# Patient Record
Sex: FEMALE | Race: WHITE | Employment: OTHER | URBAN - METROPOLITAN AREA
[De-identification: names, ages, dates, MRNs, and addresses within clinical notes are randomized per-mention and may not be internally consistent; named-entity substitution may affect disease eponyms.]

---

## 2017-12-11 ENCOUNTER — HOSPITAL ENCOUNTER (OUTPATIENT)
Dept: NON INVASIVE DIAGNOSTICS | Age: 72
Discharge: HOME | End: 2017-12-11
Payer: COMMERCIAL

## 2017-12-11 ENCOUNTER — HOSPITAL ENCOUNTER (OUTPATIENT)
Dept: NON INVASIVE DIAGNOSTICS | Age: 72
Discharge: HOME | End: 2017-12-11

## 2017-12-11 VITALS — BODY MASS INDEX: 26.61 KG/M2 | WEIGHT: 145.5 LBS

## 2017-12-11 DIAGNOSIS — R06.00 DYSPNEA: ICD-10-CM

## 2017-12-11 PROCEDURE — 93351 STRESS TTE COMPLETE: CPT

## 2017-12-11 PROCEDURE — 74011250636 HC RX REV CODE- 250/636

## 2017-12-11 RX ORDER — DOBUTAMINE HYDROCHLORIDE 200 MG/100ML
INJECTION INTRAVENOUS
Status: COMPLETED
Start: 2017-12-11 | End: 2017-12-11

## 2017-12-11 RX ORDER — DOBUTAMINE HYDROCHLORIDE 200 MG/100ML
0-40 INJECTION INTRAVENOUS
Status: DISCONTINUED | OUTPATIENT
Start: 2017-12-11 | End: 2017-12-11

## 2017-12-11 RX ADMIN — DOBUTAMINE HYDROCHLORIDE 10 MCG/KG/MIN: 200 INJECTION INTRAVENOUS at 11:16

## 2017-12-27 ENCOUNTER — HOSPITAL ENCOUNTER (OUTPATIENT)
Dept: MAMMOGRAPHY | Age: 72
Discharge: HOME | End: 2017-12-27
Payer: COMMERCIAL

## 2017-12-27 DIAGNOSIS — Z12.31 ENCOUNTER FOR SCREENING MAMMOGRAM FOR MALIGNANT NEOPLASM OF BREAST: ICD-10-CM

## 2017-12-27 PROCEDURE — 77063 BREAST TOMOSYNTHESIS BI: CPT

## 2018-01-08 ENCOUNTER — APPOINTMENT (OUTPATIENT)
Dept: GENERAL RADIOLOGY | Age: 73
End: 2018-01-08
Attending: NURSE PRACTITIONER
Payer: COMMERCIAL

## 2018-01-08 ENCOUNTER — HOSPITAL ENCOUNTER (EMERGENCY)
Age: 73
Discharge: HOME | End: 2018-01-08
Attending: EMERGENCY MEDICINE
Payer: COMMERCIAL

## 2018-01-08 ENCOUNTER — APPOINTMENT (OUTPATIENT)
Dept: CT IMAGING | Age: 73
End: 2018-01-08
Attending: NURSE PRACTITIONER
Payer: COMMERCIAL

## 2018-01-08 VITALS
SYSTOLIC BLOOD PRESSURE: 148 MMHG | TEMPERATURE: 98.1 F | DIASTOLIC BLOOD PRESSURE: 82 MMHG | HEIGHT: 62 IN | BODY MASS INDEX: 29.44 KG/M2 | HEART RATE: 79 BPM | OXYGEN SATURATION: 96 % | WEIGHT: 160 LBS | RESPIRATION RATE: 16 BRPM

## 2018-01-08 DIAGNOSIS — R03.0 ELEVATED BLOOD PRESSURE READING: ICD-10-CM

## 2018-01-08 DIAGNOSIS — R07.9 CHEST PAIN, UNSPECIFIED TYPE: Primary | ICD-10-CM

## 2018-01-08 LAB
ALBUMIN SERPL-MCNC: 4.1 G/DL (ref 3.4–5)
ALBUMIN/GLOB SERPL: 1.1 {RATIO} (ref 0.8–1.7)
ALP SERPL-CCNC: 98 U/L (ref 45–117)
ALT SERPL-CCNC: 30 U/L (ref 13–56)
ANION GAP SERPL CALC-SCNC: 10 MMOL/L (ref 3–18)
APPEARANCE UR: CLEAR
AST SERPL-CCNC: 21 U/L (ref 15–37)
BASOPHILS # BLD: 0 K/UL (ref 0–0.06)
BASOPHILS NFR BLD: 0 % (ref 0–2)
BILIRUB SERPL-MCNC: 0.3 MG/DL (ref 0.2–1)
BILIRUB UR QL: NEGATIVE
BNP SERPL-MCNC: 198 PG/ML (ref 0–900)
BUN SERPL-MCNC: 13 MG/DL (ref 7–18)
BUN/CREAT SERPL: 18 (ref 12–20)
CALCIUM SERPL-MCNC: 10.1 MG/DL (ref 8.5–10.1)
CHLORIDE SERPL-SCNC: 107 MMOL/L (ref 100–108)
CK MB CFR SERPL CALC: 2.9 % (ref 0–4)
CK MB CFR SERPL CALC: 3.2 % (ref 0–4)
CK MB SERPL-MCNC: 1.7 NG/ML (ref 5–25)
CK MB SERPL-MCNC: 2.4 NG/ML (ref 5–25)
CK SERPL-CCNC: 59 U/L (ref 26–192)
CK SERPL-CCNC: 76 U/L (ref 26–192)
CO2 SERPL-SCNC: 25 MMOL/L (ref 21–32)
COLOR UR: YELLOW
CREAT SERPL-MCNC: 0.73 MG/DL (ref 0.6–1.3)
D DIMER PPP FEU-MCNC: 0.96 UG/ML(FEU)
DIFFERENTIAL METHOD BLD: ABNORMAL
EOSINOPHIL # BLD: 0 K/UL (ref 0–0.4)
EOSINOPHIL NFR BLD: 0 % (ref 0–5)
ERYTHROCYTE [DISTWIDTH] IN BLOOD BY AUTOMATED COUNT: 14.9 % (ref 11.6–14.5)
GLOBULIN SER CALC-MCNC: 3.6 G/DL (ref 2–4)
GLUCOSE SERPL-MCNC: 171 MG/DL (ref 74–99)
GLUCOSE UR STRIP.AUTO-MCNC: NEGATIVE MG/DL
HCT VFR BLD AUTO: 42.2 % (ref 35–45)
HGB BLD-MCNC: 14.1 G/DL (ref 12–16)
HGB UR QL STRIP: NEGATIVE
KETONES UR QL STRIP.AUTO: NEGATIVE MG/DL
LEUKOCYTE ESTERASE UR QL STRIP.AUTO: NEGATIVE
LYMPHOCYTES # BLD: 0.6 K/UL (ref 0.9–3.6)
LYMPHOCYTES NFR BLD: 11 % (ref 21–52)
MCH RBC QN AUTO: 29.1 PG (ref 24–34)
MCHC RBC AUTO-ENTMCNC: 33.4 G/DL (ref 31–37)
MCV RBC AUTO: 87.2 FL (ref 74–97)
MONOCYTES # BLD: 0 K/UL (ref 0.05–1.2)
MONOCYTES NFR BLD: 1 % (ref 3–10)
NEUTS SEG # BLD: 4.5 K/UL (ref 1.8–8)
NEUTS SEG NFR BLD: 88 % (ref 40–73)
NITRITE UR QL STRIP.AUTO: NEGATIVE
PH UR STRIP: 8 [PH] (ref 5–8)
PLATELET # BLD AUTO: 267 K/UL (ref 135–420)
PMV BLD AUTO: 11 FL (ref 9.2–11.8)
POTASSIUM SERPL-SCNC: 4.2 MMOL/L (ref 3.5–5.5)
PROT SERPL-MCNC: 7.7 G/DL (ref 6.4–8.2)
PROT UR STRIP-MCNC: NEGATIVE MG/DL
RBC # BLD AUTO: 4.84 M/UL (ref 4.2–5.3)
SODIUM SERPL-SCNC: 142 MMOL/L (ref 136–145)
SP GR UR REFRACTOMETRY: 1.01 (ref 1–1.03)
TROPONIN I SERPL-MCNC: <0.02 NG/ML (ref 0–0.04)
TROPONIN I SERPL-MCNC: <0.02 NG/ML (ref 0–0.04)
UROBILINOGEN UR QL STRIP.AUTO: 0.2 EU/DL (ref 0.2–1)
WBC # BLD AUTO: 5.1 K/UL (ref 4.6–13.2)

## 2018-01-08 PROCEDURE — 74011636320 HC RX REV CODE- 636/320: Performed by: EMERGENCY MEDICINE

## 2018-01-08 PROCEDURE — 85025 COMPLETE CBC W/AUTO DIFF WBC: CPT | Performed by: NURSE PRACTITIONER

## 2018-01-08 PROCEDURE — 74011250636 HC RX REV CODE- 250/636: Performed by: NURSE PRACTITIONER

## 2018-01-08 PROCEDURE — 99285 EMERGENCY DEPT VISIT HI MDM: CPT

## 2018-01-08 PROCEDURE — 82550 ASSAY OF CK (CPK): CPT | Performed by: NURSE PRACTITIONER

## 2018-01-08 PROCEDURE — 80053 COMPREHEN METABOLIC PANEL: CPT | Performed by: NURSE PRACTITIONER

## 2018-01-08 PROCEDURE — 96360 HYDRATION IV INFUSION INIT: CPT

## 2018-01-08 PROCEDURE — 83880 ASSAY OF NATRIURETIC PEPTIDE: CPT | Performed by: NURSE PRACTITIONER

## 2018-01-08 PROCEDURE — 71045 X-RAY EXAM CHEST 1 VIEW: CPT

## 2018-01-08 PROCEDURE — 71275 CT ANGIOGRAPHY CHEST: CPT

## 2018-01-08 PROCEDURE — 93005 ELECTROCARDIOGRAM TRACING: CPT

## 2018-01-08 PROCEDURE — 74011250637 HC RX REV CODE- 250/637: Performed by: NURSE PRACTITIONER

## 2018-01-08 PROCEDURE — 96361 HYDRATE IV INFUSION ADD-ON: CPT

## 2018-01-08 PROCEDURE — 74011000258 HC RX REV CODE- 258: Performed by: EMERGENCY MEDICINE

## 2018-01-08 PROCEDURE — 81003 URINALYSIS AUTO W/O SCOPE: CPT | Performed by: NURSE PRACTITIONER

## 2018-01-08 PROCEDURE — 85379 FIBRIN DEGRADATION QUANT: CPT | Performed by: NURSE PRACTITIONER

## 2018-01-08 RX ORDER — ESTRADIOL 10 UG/1
INSERT VAGINAL
Refills: 1 | COMMUNITY
Start: 2017-12-05 | End: 2019-03-05

## 2018-01-08 RX ORDER — GUAIFENESIN 100 MG/5ML
81 LIQUID (ML) ORAL DAILY
Qty: 30 TAB | Refills: 1 | Status: SHIPPED | OUTPATIENT
Start: 2018-01-08 | End: 2019-02-19

## 2018-01-08 RX ORDER — SODIUM CHLORIDE 9 MG/ML
100 INJECTION, SOLUTION INTRAVENOUS
Status: COMPLETED | OUTPATIENT
Start: 2018-01-08 | End: 2018-01-08

## 2018-01-08 RX ORDER — NITROGLYCERIN 0.4 MG/1
0.4 TABLET SUBLINGUAL AS NEEDED
Status: DISCONTINUED | OUTPATIENT
Start: 2018-01-08 | End: 2018-01-09 | Stop reason: HOSPADM

## 2018-01-08 RX ORDER — GUAIFENESIN 100 MG/5ML
162 LIQUID (ML) ORAL
Status: COMPLETED | OUTPATIENT
Start: 2018-01-08 | End: 2018-01-08

## 2018-01-08 RX ADMIN — ASPIRIN 81 MG 162 MG: 81 TABLET ORAL at 18:55

## 2018-01-08 RX ADMIN — IOPAMIDOL 71 ML: 755 INJECTION, SOLUTION INTRAVENOUS at 20:15

## 2018-01-08 RX ADMIN — SODIUM CHLORIDE 1000 ML: 900 INJECTION, SOLUTION INTRAVENOUS at 20:45

## 2018-01-08 RX ADMIN — NITROGLYCERIN 0.4 MG: 0.4 TABLET SUBLINGUAL at 18:55

## 2018-01-08 RX ADMIN — SODIUM CHLORIDE 97 ML: 900 INJECTION, SOLUTION INTRAVENOUS at 20:16

## 2018-01-08 NOTE — ED TRIAGE NOTES
Pt came in via EMS w/ c/o intermittent midline chest tightness that gets worse upon exertion. Also feels SOB. Pt states this has been going on for 5 or 6 months.  Negative for N/V

## 2018-01-08 NOTE — ED PROVIDER NOTES
HPI Comments: 6:23 PM   74 y.o. female presents to ED C/O chest pain. Patient has a HX of hysterectomy, asthma, HTN, menopause, chest tube insertion, sleep apnea. Patient arrive via EMS with a CC of chest pressure, starting tonight about 30 minutes prior to arrival at grocery store. Patient reports similar episodes over the last 5 months, however reports tonight she noted increased CP, substernal, associated with SOB and a tightness around neck. Patient went home and checked blood pressure, noted it was very elevated and her heart rate was fast so she called 911. Patient reports continued mild chest pressure here. Patient reports she has the chest pressure when she is resting and when she is active  Patient denies recent travel but does admit to use of hormone medication. Patient denies associated abdominal pain, N/V/D, leg swelling. Patient has sleep apnea, reports compliance with CPAP at home, denies HX of SOB at night or when laying down. Patient is a nonsmoker, denies cardiac HX, denies HTN, believes it is just elevated at PCP, is never elevated at home. Patient denies any other symptoms or complaints. The history is provided by the patient and the EMS personnel. History limited by: NO language barrier.          Past Medical History:   Diagnosis Date    Asthma     Dupuytren's contracture     Foot pain, left     Hypertension     not on meds    Menopause     Rosacea     Unspecified sleep apnea     on cpap       Past Surgical History:   Procedure Laterality Date    CHEST SURGERY PROCEDURE UNLISTED      CHEST TUBE INSETION    HX BUNIONECTOMY Bilateral     HX CHOLECYSTECTOMY      HX HYSTERECTOMY  1994    HX OOPHORECTOMY      JANUSZ BIOPSY BREAST STEREOTACTIC Left 8 years about    done at [facility]         Family History:   Problem Relation Age of Onset    Hypertension Mother     Heart Disease Mother        Social History     Social History    Marital status:      Spouse name: N/A  Number of children: N/A    Years of education: N/A     Occupational History    Not on file. Social History Main Topics    Smoking status: Never Smoker    Smokeless tobacco: Not on file    Alcohol use Yes    Drug use: Not on file    Sexual activity: Not on file     Other Topics Concern    Not on file     Social History Narrative         ALLERGIES: Ancef [cefazolin] and Sulfa (sulfonamide antibiotics)    Review of Systems   Constitutional: Negative for appetite change and fever. HENT: Negative for congestion, rhinorrhea and sore throat. Respiratory: Positive for shortness of breath. Negative for cough and wheezing. Cardiovascular: Positive for chest pain. Negative for leg swelling. Gastrointestinal: Negative for abdominal pain, constipation, diarrhea, nausea and vomiting. Genitourinary: Negative for dysuria. Musculoskeletal: Negative for arthralgias and back pain. Neurological: Negative for dizziness, syncope and headaches. All other systems reviewed and are negative. Vitals:    01/08/18 2030 01/08/18 2045 01/08/18 2100 01/08/18 2115   BP: 151/86 143/84 150/79 150/83   Pulse: 87 84 81 78   Resp: 19 16 21 18   Temp:       SpO2: 97% 97% 96% 97%   Weight:       Height:                Physical Exam   Constitutional: She is oriented to person, place, and time. She appears well-developed. No distress. HENT:   Head: Atraumatic. Eyes: Conjunctivae and EOM are normal.   Cardiovascular: Regular rhythm. Tachycardia present. No peripheral edema    Pulmonary/Chest: Effort normal. No accessory muscle usage. No tachypnea. No respiratory distress. She has rhonchi in the right lower field. Speaking in complete sentences, appears in no distress. Abdominal: Soft. Bowel sounds are normal. She exhibits no distension. There is no tenderness. There is no rebound and no guarding. Musculoskeletal: Normal range of motion. Neurological: She is alert and oriented to person, place, and time. She exhibits normal muscle tone. Coordination normal.   Skin: Skin is warm and dry. No rash noted. She is not diaphoretic. No erythema. No pallor. Nursing note and vitals reviewed. MDM  Number of Diagnoses or Management Options  Chest pain, unspecified type:   Elevated blood pressure reading:   Diagnosis management comments: Differential Diagnosis: Pneumonia, pulmonary embolism, chest wall pain, angina/MI, pleurisy, pericarditis, myopericarditis, herpes zoster, aortic dissection, Prinzmetal angina, acute pericardial tamponade, GERD, PUD, esophageal motility disorders      Plan: CBC, CMP, chest xray, cardiac panel, ddimer due to estrogen medication, aspirin and nitro. EKG - Accelerated Junctional rhythm Rightward axis Abnormal ECG When compared with ECG of 11-DEC-2017 10:54, Junctional rhythm has replaced Sinus rhythm Vent. rate has increased BY 53 BPM T wave inversion now evident in Inferior leads  7:09 PM Patient denies change in SOB or Chest pressure after first nitro.  -stress echo on 12/15 - Summary:  Stress results: Target heart rate was achieved. There was resting   hypertension with an appropriate blood pressure  response to stress. There was no chest pain during stress the patient did   have flushed, headache and palpitations. Maximal systolic blood pressure during stress was 149 mmHg. Maximal diastolic   blood pressure during stress was 84 mmHg. ECG conclusions: The stress ECG was negative for ischemia. Baseline: There were no regional wall motion abnormalities. Low dose: There were no regional wall motion abnormalities. Peak stress: There was normal regional LV function. Recovery: There were no regional wall motion abnormalities. Echo image interpretation: There was no echocardiographic evidence for   stress-induced ischemia. Impressions and recommendations: This was a normal stress echocardiography   Study.   7:28 PM No elevated WBC, H&H is WNL, ddimer is elevated, CTA chest has been ordered and patient informed of results. No acute process noted on chest xray  8:11 PM Patient to CTA chest, CMP essentially normal except increased glucose of 171, BNP is WNL, initial cardiac panel is WNL  8:44 PM patient reports improvement in chest pressure at this time, has not completely resolved. 9:17 PM CTA chest -   Findings:  -No large pulmonary embolism on this motion degraded study. -Mild bronchietiasis. Bilateral dependent and basilar atelectasis and/or scarring. Evidence of granulomatous disease. Mild bilateral pelviectasis, right greater than left. Mildly dilated gas filled loops of small bowel; incompletely visualized. S-shaped scoliosis and osseous degenerative. 9:25 PM repeat EKG - p waves are now visible with  1st degree AV block. Sinus rhythm with 1st degree AV block Possible Left atrial enlargement RSR' or QR pattern in V1 suggests right ventricular conduction delay Borderline ECG When compared with ECG of 08-JAN-2018 18:22, Sinus rhythm has replaced Junctional rhythm RSR' pattern in V1 is now present  10:04 PM second trop negative. HEART score of 3, chest pain resolved in department. Patient referred to cardiology for close follow-up. Patient has had a normal stress echo in the last month. Patient's vitals are WNL prior to discharge, will refer to PCP for further evaluation of elevated blood pressure, which is already being monitored by PCP. Patient educated to return to the ED for any new or worsening symptoms, return of chest pain or tachycardia. Patient referred to cardiology. Cardiology informed of need for patient follow-up.         Amount and/or Complexity of Data Reviewed  Clinical lab tests: ordered and reviewed  Tests in the radiology section of CPT®: ordered and reviewed  Discuss the patient with other providers: yes (Dr Mae Castleman)      ED Course       Procedures               RESULTS:    XR CHEST PORT    (Results Pending)   CTA CHEST W OR W WO CONT    (Results Pending) Labs Reviewed   CBC WITH AUTOMATED DIFF - Abnormal; Notable for the following:        Result Value    RDW 14.9 (*)     NEUTROPHILS 88 (*)     LYMPHOCYTES 11 (*)     MONOCYTES 1 (*)     ABS. LYMPHOCYTES 0.6 (*)     ABS. MONOCYTES 0.0 (*)     All other components within normal limits   METABOLIC PANEL, COMPREHENSIVE - Abnormal; Notable for the following:     Glucose 171 (*)     All other components within normal limits   D DIMER - Abnormal; Notable for the following:     D DIMER 0.96 (*)     All other components within normal limits   CARDIAC PANEL,(CK, CKMB & TROPONIN)   NT-PRO BNP   URINALYSIS W/ RFLX MICROSCOPIC   CARDIAC PANEL,(CK, CKMB & TROPONIN)       Recent Results (from the past 12 hour(s))   EKG, 12 LEAD, INITIAL    Collection Time: 01/08/18  6:22 PM   Result Value Ref Range    Ventricular Rate 119 BPM    Atrial Rate 113 BPM    QRS Duration 88 ms    Q-T Interval 370 ms    QTC Calculation (Bezet) 520 ms    Calculated R Axis 92 degrees    Calculated T Axis 14 degrees    Diagnosis       Accelerated Junctional rhythm  Rightward axis  Abnormal ECG  When compared with ECG of 11-DEC-2017 10:54,  Junctional rhythm has replaced Sinus rhythm  Vent. rate has increased BY  53 BPM  T wave inversion now evident in Inferior leads     CBC WITH AUTOMATED DIFF    Collection Time: 01/08/18  6:35 PM   Result Value Ref Range    WBC 5.1 4.6 - 13.2 K/uL    RBC 4.84 4.20 - 5.30 M/uL    HGB 14.1 12.0 - 16.0 g/dL    HCT 42.2 35.0 - 45.0 %    MCV 87.2 74.0 - 97.0 FL    MCH 29.1 24.0 - 34.0 PG    MCHC 33.4 31.0 - 37.0 g/dL    RDW 14.9 (H) 11.6 - 14.5 %    PLATELET 437 488 - 167 K/uL    MPV 11.0 9.2 - 11.8 FL    NEUTROPHILS 88 (H) 40 - 73 %    LYMPHOCYTES 11 (L) 21 - 52 %    MONOCYTES 1 (L) 3 - 10 %    EOSINOPHILS 0 0 - 5 %    BASOPHILS 0 0 - 2 %    ABS. NEUTROPHILS 4.5 1.8 - 8.0 K/UL    ABS. LYMPHOCYTES 0.6 (L) 0.9 - 3.6 K/UL    ABS. MONOCYTES 0.0 (L) 0.05 - 1.2 K/UL    ABS. EOSINOPHILS 0.0 0.0 - 0.4 K/UL    ABS.  BASOPHILS 0.0 0.0 - 0.06 K/UL    DF AUTOMATED     METABOLIC PANEL, COMPREHENSIVE    Collection Time: 01/08/18  6:35 PM   Result Value Ref Range    Sodium 142 136 - 145 mmol/L    Potassium 4.2 3.5 - 5.5 mmol/L    Chloride 107 100 - 108 mmol/L    CO2 25 21 - 32 mmol/L    Anion gap 10 3.0 - 18 mmol/L    Glucose 171 (H) 74 - 99 mg/dL    BUN 13 7.0 - 18 MG/DL    Creatinine 0.73 0.6 - 1.3 MG/DL    BUN/Creatinine ratio 18 12 - 20      GFR est AA >60 >60 ml/min/1.73m2    GFR est non-AA >60 >60 ml/min/1.73m2    Calcium 10.1 8.5 - 10.1 MG/DL    Bilirubin, total 0.3 0.2 - 1.0 MG/DL    ALT (SGPT) 30 13 - 56 U/L    AST (SGOT) 21 15 - 37 U/L    Alk.  phosphatase 98 45 - 117 U/L    Protein, total 7.7 6.4 - 8.2 g/dL    Albumin 4.1 3.4 - 5.0 g/dL    Globulin 3.6 2.0 - 4.0 g/dL    A-G Ratio 1.1 0.8 - 1.7     CARDIAC PANEL,(CK, CKMB & TROPONIN)    Collection Time: 01/08/18  6:35 PM   Result Value Ref Range    CK 76 26 - 192 U/L    CK - MB 2.4 <3.6 ng/ml    CK-MB Index 3.2 0.0 - 4.0 %    Troponin-I, Qt. <0.02 0.0 - 0.045 NG/ML   NT-PRO BNP    Collection Time: 01/08/18  6:35 PM   Result Value Ref Range    NT pro- 0 - 900 PG/ML   D DIMER    Collection Time: 01/08/18  6:35 PM   Result Value Ref Range    D DIMER 0.96 (H) <0.46 ug/ml(FEU)   URINALYSIS W/ RFLX MICROSCOPIC    Collection Time: 01/08/18  8:01 PM   Result Value Ref Range    Color YELLOW      Appearance CLEAR      Specific gravity 1.011 1.005 - 1.030      pH (UA) 8.0 5.0 - 8.0      Protein NEGATIVE  NEG mg/dL    Glucose NEGATIVE  NEG mg/dL    Ketone NEGATIVE  NEG mg/dL    Bilirubin NEGATIVE  NEG      Blood NEGATIVE  NEG      Urobilinogen 0.2 0.2 - 1.0 EU/dL    Nitrites NEGATIVE  NEG      Leukocyte Esterase NEGATIVE  NEG     EKG, 12 LEAD, SUBSEQUENT    Collection Time: 01/08/18  9:22 PM   Result Value Ref Range    Ventricular Rate 82 BPM    Atrial Rate 82 BPM    P-R Interval 248 ms    QRS Duration 82 ms    Q-T Interval 382 ms    QTC Calculation (Bezet) 446 ms    Calculated P Axis 58 degrees    Calculated R Axis 74 degrees    Calculated T Axis 30 degrees    Diagnosis       Sinus rhythm with 1st degree AV block  Possible Left atrial enlargement  RSR' or QR pattern in V1 suggests right ventricular conduction delay  Borderline ECG  When compared with ECG of 08-JAN-2018 18:22,  Sinus rhythm has replaced Junctional rhythm  RSR' pattern in V1 is now present     CARDIAC PANEL,(CK, CKMB & TROPONIN)    Collection Time: 01/08/18  9:28 PM   Result Value Ref Range    CK 59 26 - 192 U/L    CK - MB 1.7 <3.6 ng/ml    CK-MB Index 2.9 0.0 - 4.0 %    Troponin-I, Qt. <0.02 0.0 - 0.045 NG/ML     PROGRESS NOTE:   6:23 PM   Initial assessment completed. Written by Tripp DOWLING    One or more blood pressure readings were noted elevated during the Pt's presentation in the emergency department this date. This abnormal reading has been cited in the Pt's diagnosis, and they have been encouraged to follow up with their primary care physician, or referred to a consultant for further evaluation and treatment. DISCHARGE NOTE:  10:12 PM   Gracie Cardozo's  results have been reviewed with her. She has been counseled regarding her diagnosis, treatment, and plan. She verbally conveys understanding and agreement of the signs, symptoms, diagnosis, treatment and prognosis and additionally agrees to follow up as discussed. She also agrees with the care-plan and conveys that all of her questions have been answered. I have also provided discharge instructions for her that include: educational information regarding their diagnosis and treatment, and list of reasons why they would want to return to the ED prior to their follow-up appointment, should her condition change. CLINICAL IMPRESSION:    1. Chest pain, unspecified type    2.  Elevated blood pressure reading        AFTER VISIT PLAN:    Current Discharge Medication List      START taking these medications    Details   aspirin 81 mg chewable tablet Take 1 Tab by mouth daily. Qty: 30 Tab, Refills: 1      albuterol sulfate (PROAIR RESPICLICK) 90 mcg/actuation aepb Take 2 Puffs by inhalation every four (4) hours as needed.   Qty: 1 Inhaler, Refills: 0              Follow-up Information     Follow up With Details Comments [address] MD Barbra Schedule an appointment as soon as possible for a visit in 3 days Further evaluation [place] [address]  [address] [facility] [address] [facility]      Tanmay Morales MD Schedule an appointment as soon as possible for a visit in 1 week Further evaluation [address]  [address] [facility], DO Schedule an appointment as soon as possible for a visit in 3 days Further evaluation [facility] [place] [address]  [phone]             Written by Kirsten VILLALBAC

## 2018-01-09 NOTE — ED NOTES
I have reviewed discharge instructions with the patient. The patient verbalized understanding. Pt agreeable to dc plan, pt in nad ambulatory to ED lobby in no distress.

## 2018-01-10 LAB
ATRIAL RATE: 113 BPM
ATRIAL RATE: 82 BPM
CALCULATED P AXIS, ECG09: 58 DEGREES
CALCULATED R AXIS, ECG10: 74 DEGREES
CALCULATED R AXIS, ECG10: 92 DEGREES
CALCULATED T AXIS, ECG11: 14 DEGREES
CALCULATED T AXIS, ECG11: 30 DEGREES
DIAGNOSIS, 93000: NORMAL
DIAGNOSIS, 93000: NORMAL
P-R INTERVAL, ECG05: 248 MS
Q-T INTERVAL, ECG07: 370 MS
Q-T INTERVAL, ECG07: 382 MS
QRS DURATION, ECG06: 82 MS
QRS DURATION, ECG06: 88 MS
QTC CALCULATION (BEZET), ECG08: 446 MS
QTC CALCULATION (BEZET), ECG08: 520 MS
VENTRICULAR RATE, ECG03: 119 BPM
VENTRICULAR RATE, ECG03: 82 BPM

## 2018-01-11 ENCOUNTER — DOCUMENTATION ONLY (OUTPATIENT)
Dept: CARDIOLOGY CLINIC | Age: 73
End: 2018-01-11

## 2018-01-11 NOTE — PROGRESS NOTES
Merrick Hall  Female, 74 y.o., 1945  Weight:   72.6 kg (160 lb)  Phone:   Z:[phone]  PCP:   Aleksandar Garcia MD  MRN:   [id number]  MyChart:   Pending  Next Appt (With Me)  None  Next Appt (Any Provider)  None    Message  Received: Today       [address] [facility]; [place] [address]; [place]       Cc: Husam Allen                     This patient is always seen @ [facility] not [facility] . Breanne Carrington  Could someone please contact her to set her up with an appointment.            Previous Messages       ----- Message -----      From: Marilyn Jay MD      Sent: 1/9/2018  10:40 AM        To: Fortunato Cruz, [address] [place] [address] Encounter for Chest Pain  1/8/2018        Encounter Summary       Diagnoses       Chest Pain, Unspecified Type (Primary)       Elevated blood pressure reading                Orders Signed This Encounter (29) View All Results      albuterol sulfate (PROAIR RESPICLICK) 90 mcg/actuation aepb        aspirin 81 mg chewable tablet        CARDIAC PANEL,(CK, CKMB & TROPONIN) View Result       CARDIAC PANEL,(CK, CKMB & TROPONIN)        EKG, 12 LEAD, SUBSEQUENT View Result       EKG, 12 LEAD, SUBSEQUENT        sodium chloride 0.9 % bolus infusion 1,000 mL        0.9% sodium chloride infusion 100 mL        iopamidol (ISOVUE-370) 76 % injection 75 mL        CTA CHEST W OR W WO CONT View Result       CTA CHEST W OR W WO CONT        URINALYSIS W/ RFLX MICROSCOPIC View Result       URINALYSIS W/ RFLX MICROSCOPIC        D DIMER View Result       D DIMER        XR CHEST PORT View Result       XR CHEST PORT        estradiol (VAGIFEM) 10 mcg tab vaginal tablet        NT-PRO BNP View Result       CARDIAC PANEL,(CK, CKMB & TROPONIN) View Result       METABOLIC PANEL, COMPREHENSIVE View Result       CBC WITH AUTOMATED DIFF View Result       aspirin chewable tablet 162 mg        NT-PRO BNP        CARDIAC PANEL,(CK, CKMB & TROPONIN)        METABOLIC PANEL, COMPREHENSIVE        CBC WITH AUTOMATED DIFF        EKG, 12 LEAD, INITIAL View Result       EKG, 12 LEAD, INITIAL                 Orders Pended This Encounter        None                Progress notes        No notes of this type exist for this encounter.

## 2018-01-16 ENCOUNTER — TELEPHONE (OUTPATIENT)
Dept: CARDIOLOGY CLINIC | Age: 73
End: 2018-01-16

## 2018-01-16 NOTE — TELEPHONE ENCOUNTER
Pt stated that she is working with her PCP to schedule cardio appt, does not need to sched an appt with our office.

## 2019-01-07 ENCOUNTER — HOSPITAL ENCOUNTER (OUTPATIENT)
Dept: MAMMOGRAPHY | Age: 74
Discharge: HOME | End: 2019-01-07
Payer: COMMERCIAL

## 2019-01-07 DIAGNOSIS — Z12.31 VISIT FOR SCREENING MAMMOGRAM: ICD-10-CM

## 2019-01-07 PROCEDURE — 77063 BREAST TOMOSYNTHESIS BI: CPT

## 2019-02-13 ENCOUNTER — HOSPITAL ENCOUNTER (OUTPATIENT)
Dept: PREADMISSION TESTING | Age: 74
Discharge: HOME | End: 2019-02-13
Payer: COMMERCIAL

## 2019-02-13 DIAGNOSIS — Z01.818 PREOP EXAMINATION: ICD-10-CM

## 2019-02-13 LAB
ATRIAL RATE: 72 BPM
CALCULATED P AXIS, ECG09: 63 DEGREES
CALCULATED R AXIS, ECG10: 80 DEGREES
CALCULATED T AXIS, ECG11: 52 DEGREES
DIAGNOSIS, 93000: NORMAL
P-R INTERVAL, ECG05: 320 MS
Q-T INTERVAL, ECG07: 384 MS
QRS DURATION, ECG06: 92 MS
QTC CALCULATION (BEZET), ECG08: 420 MS
SENTARA SPECIMEN COL,SENBCF: NORMAL
VENTRICULAR RATE, ECG03: 72 BPM

## 2019-02-13 PROCEDURE — 93005 ELECTROCARDIOGRAM TRACING: CPT

## 2019-02-13 PROCEDURE — 99001 SPECIMEN HANDLING PT-LAB: CPT

## 2019-02-28 PROBLEM — M17.12 OSTEOARTHRITIS OF LEFT KNEE: Chronic | Status: ACTIVE | Noted: 2019-02-28

## 2019-02-28 NOTE — H&P
[address] History and Physical Exam 
 
Patient: Itz Muñiz MRN: [id number]  SSN: [id number] YOB: 1945  Age: 74 y.o. Sex: female Subjective: Chief Complaint: Left knee pain History of Present Illness:  Patient complains of pain to the left knee and difficulty ambulating, which has progressively worsened over several months. X-rays showed osteoarthritis of the joint. The patient's pain has persisted and progressed despite conservative treatments and therapies. The patient has been previously treated with NSaIDs. The patient has at this time opted for surgical intervention. Past Medical History:  
Diagnosis Date  Arthritis  Dupuytren's contracture  Foot pain, left  Hypertension 2018  Menopause  Osteoarthritis of left knee 2/28/2019  Rosacea  Unspecified sleep apnea   
 advised to bring CPAP day of surgery Past Surgical History:  
Procedure Laterality Date  CHEST SURGERY PROCEDURE UNLISTED    
 CHEST TUBE INSETION  
 HX BUNIONECTOMY Bilateral   
 HX CATARACT REMOVAL Bilateral   
 [address]  HX LAP CHOLECYSTECTOMY  HX OOPHORECTOMY  HX ORTHOPAEDIC Left   
 foot  HX ORTHOPAEDIC Bilateral   
 bunionectomy  HX TONSILLECTOMY  JANUSZ BIOPSY BREAST STEREOTACTIC Left 8 years about  
 done at [facility] Social History Occupational History  Not on file Tobacco Use  Smoking status: Never Smoker  Smokeless tobacco: Never Used Substance and Sexual Activity  Alcohol use: Yes Comment: monthly  Drug use: No  
 Sexual activity: Not on file Prior to Admission medications Medication Sig Start Date End Date Taking? Authorizing Provider  
verapamil ER (VERELAN PM) 100 mg capsule Take 100 mg by mouth nightly. Provider, Historical  
atorvastatin (LIPITOR) 40 mg tablet Take 40 mg by mouth nightly.     Provider, Historical  
 estradiol (VAGIFEM) 10 mcg tab vaginal tablet I ONE T VAG  TWICE WEEKLY 12/5/17   Other, MD Radha  
multivitamin (ONE A DAY) tablet Take 1 Tab by mouth daily. Provider, Historical  
estradiol (ESTRACE) 0.5 mg tablet Take  by mouth daily. Provider, Historical  
acetaminophen (TYLENOL EXTRA STRENGTH) 500 mg tablet Take  by mouth every six (6) hours as needed for Pain. Provider, Historical  
 
 
Allergies: Allergies Allergen Reactions  Ancef [Cefazolin] Anaphylaxis  Sulfa (Sulfonamide Antibiotics) Itching and Swelling Review of Systems: 
Pertinent items are noted in the History of Present Illness. Objective:   
  
Physical Exam: 
HEENT: Normocephalic, atraumatic Lungs:  Clear to auscultation Heart:   Regular rate and rhythm Abdomen: Soft Extremities:  Pain with range of motion of the left knee. Active ROM limited due to pain. Tenderness generalized. Crepitus present. Antalgic gait. Assessment:   
 
Arthritis of the left knee. Plan:    
 
Proceed with scheduled LEFT TOTAL KNEE ARTHROPLASTY. Due to past medical history significant for HTN, MAURICIO, this patient may benefit from an inpatient admission for post operative monitoring of comorbid conditions. The various methods of treatment have been discussed with the patient and family. After consideration of risks, benefits, and other options for treatment, the patient has consented to surgical interventions. Questions were answered and preoperative teaching was done by Dr Nathanael Levin. Signed By: MYA Barahona February 28, 2019

## 2019-02-28 NOTE — ROUTINE PROCESS
Brijesh Womack has decided with their surgeon to have a joint replacement to improve mobility and decrease pain. Joint Replacement Pre-Operative class was attended today. Topics discussed included surgery preparation, what to expect the day of surgery, medications (to include a multimodal approach to pain control and limiting narcotics), nutrition, glycemic control, respiratory therapy, physical and occupational therapy, and discharge planning. Discussed the importance of using these alternative pain management methods with the goal of using less opioid use after surgery and at home. A patient education notebook was provided and the opportunity was given to ask questions. The phone number of the Orthopaedic  was provided for any future questions or concerns.

## 2019-03-04 ENCOUNTER — ANESTHESIA EVENT (OUTPATIENT)
Dept: SURGERY | Age: 74
DRG: 470 | End: 2019-03-04
Payer: COMMERCIAL

## 2019-03-04 ENCOUNTER — ANESTHESIA (OUTPATIENT)
Dept: SURGERY | Age: 74
DRG: 470 | End: 2019-03-04
Payer: COMMERCIAL

## 2019-03-04 ENCOUNTER — APPOINTMENT (OUTPATIENT)
Dept: GENERAL RADIOLOGY | Age: 74
DRG: 470 | End: 2019-03-04
Attending: PHYSICIAN ASSISTANT
Payer: COMMERCIAL

## 2019-03-04 ENCOUNTER — HOSPITAL ENCOUNTER (INPATIENT)
Age: 74
LOS: 1 days | Discharge: HOME HEALTH | DRG: 470 | End: 2019-03-05
Attending: ORTHOPAEDIC SURGERY | Admitting: ORTHOPAEDIC SURGERY
Payer: COMMERCIAL

## 2019-03-04 DIAGNOSIS — M17.12 PRIMARY OSTEOARTHRITIS OF LEFT KNEE: Primary | Chronic | ICD-10-CM

## 2019-03-04 LAB
ABO + RH BLD: NORMAL
BLOOD GROUP ANTIBODIES SERPL: NORMAL
SPECIMEN EXP DATE BLD: NORMAL

## 2019-03-04 PROCEDURE — 74011250636 HC RX REV CODE- 250/636

## 2019-03-04 PROCEDURE — 64447 NJX AA&/STRD FEMORAL NRV IMG: CPT | Performed by: ORTHOPAEDIC SURGERY

## 2019-03-04 PROCEDURE — 77030002934 HC SUT MCRYL J&J -B: Performed by: ORTHOPAEDIC SURGERY

## 2019-03-04 PROCEDURE — 77030027138 HC INCENT SPIROMETER -A: Performed by: ORTHOPAEDIC SURGERY

## 2019-03-04 PROCEDURE — 77030033263 HC DRSG MEPILEX 16-48IN BORD MOLN -B: Performed by: ORTHOPAEDIC SURGERY

## 2019-03-04 PROCEDURE — 97161 PT EVAL LOW COMPLEX 20 MIN: CPT

## 2019-03-04 PROCEDURE — 77030003666 HC NDL SPINAL BD -A: Performed by: ORTHOPAEDIC SURGERY

## 2019-03-04 PROCEDURE — 65270000029 HC RM PRIVATE

## 2019-03-04 PROCEDURE — 77030031139 HC SUT VCRL2 J&J -A: Performed by: ORTHOPAEDIC SURGERY

## 2019-03-04 PROCEDURE — 77030032489 HC SLV COMPR SCD FT CUF COVD -B: Performed by: ORTHOPAEDIC SURGERY

## 2019-03-04 PROCEDURE — 74011250637 HC RX REV CODE- 250/637: Performed by: PHYSICIAN ASSISTANT

## 2019-03-04 PROCEDURE — 97116 GAIT TRAINING THERAPY: CPT

## 2019-03-04 PROCEDURE — 74011000250 HC RX REV CODE- 250

## 2019-03-04 PROCEDURE — 77030038010: Performed by: ORTHOPAEDIC SURGERY

## 2019-03-04 PROCEDURE — 77030020259 HC SOL INJ SOD CL 0.9% 100ML BG: Performed by: ORTHOPAEDIC SURGERY

## 2019-03-04 PROCEDURE — 77030020782 HC GWN BAIR PAWS FLX 3M -B: Performed by: ORTHOPAEDIC SURGERY

## 2019-03-04 PROCEDURE — 77030039267 HC ADH SKN EXOFIN S2SG -B: Performed by: ORTHOPAEDIC SURGERY

## 2019-03-04 PROCEDURE — 77030034694 HC SCPL CANADY PLSM DISP USMD -E: Performed by: ORTHOPAEDIC SURGERY

## 2019-03-04 PROCEDURE — 77030011628: Performed by: ORTHOPAEDIC SURGERY

## 2019-03-04 PROCEDURE — 76060000033 HC ANESTHESIA 1 TO 1.5 HR: Performed by: ORTHOPAEDIC SURGERY

## 2019-03-04 PROCEDURE — C1776 JOINT DEVICE (IMPLANTABLE): HCPCS | Performed by: ORTHOPAEDIC SURGERY

## 2019-03-04 PROCEDURE — 77030013708 HC HNDPC SUC IRR PULS STRY –B: Performed by: ORTHOPAEDIC SURGERY

## 2019-03-04 PROCEDURE — 86900 BLOOD TYPING SEROLOGIC ABO: CPT

## 2019-03-04 PROCEDURE — 77030018836 HC SOL IRR NACL ICUM -A: Performed by: ORTHOPAEDIC SURGERY

## 2019-03-04 PROCEDURE — 36415 COLL VENOUS BLD VENIPUNCTURE: CPT

## 2019-03-04 PROCEDURE — 0SRD0J9 REPLACEMENT OF LEFT KNEE JOINT WITH SYNTHETIC SUBSTITUTE, CEMENTED, OPEN APPROACH: ICD-10-PCS | Performed by: ORTHOPAEDIC SURGERY

## 2019-03-04 PROCEDURE — C9290 INJ, BUPIVACAINE LIPOSOME: HCPCS | Performed by: ORTHOPAEDIC SURGERY

## 2019-03-04 PROCEDURE — 74011250636 HC RX REV CODE- 250/636: Performed by: ORTHOPAEDIC SURGERY

## 2019-03-04 PROCEDURE — 74011250637 HC RX REV CODE- 250/637: Performed by: SPECIALIST

## 2019-03-04 PROCEDURE — 76010000149 HC OR TIME 1 TO 1.5 HR: Performed by: ORTHOPAEDIC SURGERY

## 2019-03-04 PROCEDURE — 77030039760: Performed by: ORTHOPAEDIC SURGERY

## 2019-03-04 PROCEDURE — C1713 ANCHOR/SCREW BN/BN,TIS/BN: HCPCS | Performed by: ORTHOPAEDIC SURGERY

## 2019-03-04 PROCEDURE — 77030012508 HC MSK AIRWY LMA AMBU -A: Performed by: SPECIALIST

## 2019-03-04 PROCEDURE — 76942 ECHO GUIDE FOR BIOPSY: CPT | Performed by: ORTHOPAEDIC SURGERY

## 2019-03-04 PROCEDURE — 3E0T3BZ INTRODUCTION OF ANESTHETIC AGENT INTO PERIPHERAL NERVES AND PLEXI, PERCUTANEOUS APPROACH: ICD-10-PCS | Performed by: SPECIALIST

## 2019-03-04 PROCEDURE — 76210000017 HC OR PH I REC 1.5 TO 2 HR: Performed by: ORTHOPAEDIC SURGERY

## 2019-03-04 PROCEDURE — 74011250636 HC RX REV CODE- 250/636: Performed by: PHYSICIAN ASSISTANT

## 2019-03-04 PROCEDURE — 74011000258 HC RX REV CODE- 258: Performed by: ORTHOPAEDIC SURGERY

## 2019-03-04 PROCEDURE — 73560 X-RAY EXAM OF KNEE 1 OR 2: CPT

## 2019-03-04 PROCEDURE — 77030012893: Performed by: ORTHOPAEDIC SURGERY

## 2019-03-04 PROCEDURE — 74011250637 HC RX REV CODE- 250/637: Performed by: ORTHOPAEDIC SURGERY

## 2019-03-04 PROCEDURE — 74011000250 HC RX REV CODE- 250: Performed by: ORTHOPAEDIC SURGERY

## 2019-03-04 DEVICE — COMPONENT TOT KNEE CEM: Type: IMPLANTABLE DEVICE | Site: KNEE | Status: FUNCTIONAL

## 2019-03-04 DEVICE — CEMENT BNE 40GM FULL DOSE PMMA W/O ANTIBIO HI VISC N RADPQ: Type: IMPLANTABLE DEVICE | Site: KNEE | Status: FUNCTIONAL

## 2019-03-04 RX ORDER — ASPIRIN 81 MG/1
81 TABLET ORAL 2 TIMES DAILY
Qty: 42 TAB | Refills: 0 | Status: SHIPPED | OUTPATIENT
Start: 2019-03-04 | End: 2019-03-25

## 2019-03-04 RX ORDER — PROPOFOL 10 MG/ML
INJECTION, EMULSION INTRAVENOUS AS NEEDED
Status: DISCONTINUED | OUTPATIENT
Start: 2019-03-04 | End: 2019-03-04 | Stop reason: HOSPADM

## 2019-03-04 RX ORDER — ASPIRIN 81 MG/1
81 TABLET ORAL 2 TIMES DAILY
Status: DISCONTINUED | OUTPATIENT
Start: 2019-03-04 | End: 2019-03-05 | Stop reason: HOSPADM

## 2019-03-04 RX ORDER — ZOLPIDEM TARTRATE 5 MG/1
5-10 TABLET ORAL
Status: DISCONTINUED | OUTPATIENT
Start: 2019-03-04 | End: 2019-03-05 | Stop reason: HOSPADM

## 2019-03-04 RX ORDER — DEXAMETHASONE SODIUM PHOSPHATE 4 MG/ML
8 INJECTION, SOLUTION INTRA-ARTICULAR; INTRALESIONAL; INTRAMUSCULAR; INTRAVENOUS; SOFT TISSUE ONCE
Status: COMPLETED | OUTPATIENT
Start: 2019-03-04 | End: 2019-03-04

## 2019-03-04 RX ORDER — ONDANSETRON 2 MG/ML
4 INJECTION INTRAMUSCULAR; INTRAVENOUS
Status: DISCONTINUED | OUTPATIENT
Start: 2019-03-04 | End: 2019-03-05 | Stop reason: HOSPADM

## 2019-03-04 RX ORDER — CLINDAMYCIN PHOSPHATE 900 MG/50ML
900 INJECTION, SOLUTION INTRAVENOUS ONCE
Status: COMPLETED | OUTPATIENT
Start: 2019-03-04 | End: 2019-03-04

## 2019-03-04 RX ORDER — NALOXONE HYDROCHLORIDE 0.4 MG/ML
0.2 INJECTION, SOLUTION INTRAMUSCULAR; INTRAVENOUS; SUBCUTANEOUS AS NEEDED
Status: DISCONTINUED | OUTPATIENT
Start: 2019-03-04 | End: 2019-03-04 | Stop reason: HOSPADM

## 2019-03-04 RX ORDER — OXYCODONE AND ACETAMINOPHEN 5; 325 MG/1; MG/1
1-2 TABLET ORAL
Qty: 60 TAB | Refills: 0 | Status: SHIPPED | OUTPATIENT
Start: 2019-03-04 | End: 2019-03-09

## 2019-03-04 RX ORDER — SODIUM CHLORIDE 0.9 % (FLUSH) 0.9 %
5-40 SYRINGE (ML) INJECTION AS NEEDED
Status: DISCONTINUED | OUTPATIENT
Start: 2019-03-04 | End: 2019-03-04 | Stop reason: HOSPADM

## 2019-03-04 RX ORDER — INSULIN LISPRO 100 [IU]/ML
INJECTION, SOLUTION INTRAVENOUS; SUBCUTANEOUS ONCE
Status: DISCONTINUED | OUTPATIENT
Start: 2019-03-04 | End: 2019-03-04 | Stop reason: HOSPADM

## 2019-03-04 RX ORDER — NALOXONE HYDROCHLORIDE 0.4 MG/ML
0.4 INJECTION, SOLUTION INTRAMUSCULAR; INTRAVENOUS; SUBCUTANEOUS AS NEEDED
Status: DISCONTINUED | OUTPATIENT
Start: 2019-03-04 | End: 2019-03-05 | Stop reason: HOSPADM

## 2019-03-04 RX ORDER — SODIUM CHLORIDE, SODIUM LACTATE, POTASSIUM CHLORIDE, CALCIUM CHLORIDE 600; 310; 30; 20 MG/100ML; MG/100ML; MG/100ML; MG/100ML
100 INJECTION, SOLUTION INTRAVENOUS CONTINUOUS
Status: DISCONTINUED | OUTPATIENT
Start: 2019-03-04 | End: 2019-03-04 | Stop reason: HOSPADM

## 2019-03-04 RX ORDER — VERAPAMIL HYDROCHLORIDE 100 MG/1
100 CAPSULE, EXTENDED RELEASE ORAL
Status: DISCONTINUED | OUTPATIENT
Start: 2019-03-04 | End: 2019-03-05 | Stop reason: HOSPADM

## 2019-03-04 RX ORDER — ACETAMINOPHEN 325 MG/1
650 TABLET ORAL EVERY 6 HOURS
Status: DISCONTINUED | OUTPATIENT
Start: 2019-03-04 | End: 2019-03-05 | Stop reason: HOSPADM

## 2019-03-04 RX ORDER — SODIUM CHLORIDE 0.9 % (FLUSH) 0.9 %
5-40 SYRINGE (ML) INJECTION EVERY 8 HOURS
Status: DISCONTINUED | OUTPATIENT
Start: 2019-03-04 | End: 2019-03-05 | Stop reason: HOSPADM

## 2019-03-04 RX ORDER — VERAPAMIL HYDROCHLORIDE 120 MG/1
120 TABLET, FILM COATED, EXTENDED RELEASE ORAL ONCE
Status: COMPLETED | OUTPATIENT
Start: 2019-03-04 | End: 2019-03-04

## 2019-03-04 RX ORDER — CLOBETASOL PROPIONATE 0.5 MG/G
CREAM TOPICAL 2 TIMES WEEKLY
COMMUNITY

## 2019-03-04 RX ORDER — ONDANSETRON 2 MG/ML
INJECTION INTRAMUSCULAR; INTRAVENOUS AS NEEDED
Status: DISCONTINUED | OUTPATIENT
Start: 2019-03-04 | End: 2019-03-04 | Stop reason: HOSPADM

## 2019-03-04 RX ORDER — FENTANYL CITRATE 50 UG/ML
25 INJECTION, SOLUTION INTRAMUSCULAR; INTRAVENOUS
Status: DISCONTINUED | OUTPATIENT
Start: 2019-03-04 | End: 2019-03-04 | Stop reason: HOSPADM

## 2019-03-04 RX ORDER — PRAVASTATIN SODIUM 40 MG/1
40 TABLET ORAL
COMMUNITY

## 2019-03-04 RX ORDER — TRANEXAMIC ACID 650 1/1
1950 TABLET ORAL ONCE
Status: COMPLETED | OUTPATIENT
Start: 2019-03-04 | End: 2019-03-04

## 2019-03-04 RX ORDER — LANOLIN ALCOHOL/MO/W.PET/CERES
1 CREAM (GRAM) TOPICAL 3 TIMES DAILY
Status: DISCONTINUED | OUTPATIENT
Start: 2019-03-04 | End: 2019-03-05 | Stop reason: HOSPADM

## 2019-03-04 RX ORDER — METOCLOPRAMIDE HYDROCHLORIDE 5 MG/ML
10 INJECTION INTRAMUSCULAR; INTRAVENOUS
Status: DISCONTINUED | OUTPATIENT
Start: 2019-03-04 | End: 2019-03-05 | Stop reason: HOSPADM

## 2019-03-04 RX ORDER — DEXTROSE 50 % IN WATER (D50W) INTRAVENOUS SYRINGE
25-50 AS NEEDED
Status: DISCONTINUED | OUTPATIENT
Start: 2019-03-04 | End: 2019-03-04 | Stop reason: HOSPADM

## 2019-03-04 RX ORDER — SODIUM CHLORIDE 0.9 % (FLUSH) 0.9 %
5-40 SYRINGE (ML) INJECTION EVERY 8 HOURS
Status: DISCONTINUED | OUTPATIENT
Start: 2019-03-04 | End: 2019-03-04 | Stop reason: HOSPADM

## 2019-03-04 RX ORDER — OXYCODONE HYDROCHLORIDE 5 MG/1
5-10 TABLET ORAL
Status: DISCONTINUED | OUTPATIENT
Start: 2019-03-04 | End: 2019-03-05 | Stop reason: HOSPADM

## 2019-03-04 RX ORDER — SODIUM CHLORIDE 9 MG/ML
125 INJECTION, SOLUTION INTRAVENOUS CONTINUOUS
Status: DISPENSED | OUTPATIENT
Start: 2019-03-04 | End: 2019-03-05

## 2019-03-04 RX ORDER — DIPHENHYDRAMINE HYDROCHLORIDE 50 MG/ML
12.5 INJECTION, SOLUTION INTRAMUSCULAR; INTRAVENOUS
Status: DISCONTINUED | OUTPATIENT
Start: 2019-03-04 | End: 2019-03-05 | Stop reason: HOSPADM

## 2019-03-04 RX ORDER — MIDAZOLAM HYDROCHLORIDE 1 MG/ML
INJECTION, SOLUTION INTRAMUSCULAR; INTRAVENOUS AS NEEDED
Status: DISCONTINUED | OUTPATIENT
Start: 2019-03-04 | End: 2019-03-04 | Stop reason: HOSPADM

## 2019-03-04 RX ORDER — SODIUM CHLORIDE, SODIUM LACTATE, POTASSIUM CHLORIDE, CALCIUM CHLORIDE 600; 310; 30; 20 MG/100ML; MG/100ML; MG/100ML; MG/100ML
125 INJECTION, SOLUTION INTRAVENOUS CONTINUOUS
Status: DISCONTINUED | OUTPATIENT
Start: 2019-03-04 | End: 2019-03-05 | Stop reason: HOSPADM

## 2019-03-04 RX ORDER — MELOXICAM 7.5 MG/1
7.5 TABLET ORAL 2 TIMES DAILY
Qty: 28 TAB | Refills: 0 | Status: SHIPPED | OUTPATIENT
Start: 2019-03-04 | End: 2019-03-18

## 2019-03-04 RX ORDER — CYCLOSPORINE 0.5 MG/ML
1 EMULSION OPHTHALMIC EVERY 12 HOURS
COMMUNITY

## 2019-03-04 RX ORDER — PREGABALIN 50 MG/1
50 CAPSULE ORAL
Status: COMPLETED | OUTPATIENT
Start: 2019-03-04 | End: 2019-03-04

## 2019-03-04 RX ORDER — HYDROMORPHONE HYDROCHLORIDE 2 MG/ML
INJECTION, SOLUTION INTRAMUSCULAR; INTRAVENOUS; SUBCUTANEOUS AS NEEDED
Status: DISCONTINUED | OUTPATIENT
Start: 2019-03-04 | End: 2019-03-04 | Stop reason: HOSPADM

## 2019-03-04 RX ORDER — SODIUM CHLORIDE 9 MG/ML
300 INJECTION, SOLUTION INTRAVENOUS CONTINUOUS
Status: DISPENSED | OUTPATIENT
Start: 2019-03-04 | End: 2019-03-04

## 2019-03-04 RX ORDER — ACETAMINOPHEN 500 MG
1000 TABLET ORAL
Status: COMPLETED | OUTPATIENT
Start: 2019-03-04 | End: 2019-03-04

## 2019-03-04 RX ORDER — SODIUM CHLORIDE 0.9 % (FLUSH) 0.9 %
5-40 SYRINGE (ML) INJECTION AS NEEDED
Status: DISCONTINUED | OUTPATIENT
Start: 2019-03-04 | End: 2019-03-05 | Stop reason: HOSPADM

## 2019-03-04 RX ORDER — ATORVASTATIN CALCIUM 20 MG/1
40 TABLET, FILM COATED ORAL
Status: DISCONTINUED | OUTPATIENT
Start: 2019-03-04 | End: 2019-03-04

## 2019-03-04 RX ORDER — MAGNESIUM SULFATE 100 %
4 CRYSTALS MISCELLANEOUS AS NEEDED
Status: DISCONTINUED | OUTPATIENT
Start: 2019-03-04 | End: 2019-03-04 | Stop reason: HOSPADM

## 2019-03-04 RX ORDER — PANTOPRAZOLE SODIUM 40 MG/1
40 TABLET, DELAYED RELEASE ORAL DAILY
Status: DISCONTINUED | OUTPATIENT
Start: 2019-03-04 | End: 2019-03-05 | Stop reason: HOSPADM

## 2019-03-04 RX ORDER — LIDOCAINE HYDROCHLORIDE 20 MG/ML
INJECTION, SOLUTION EPIDURAL; INFILTRATION; INTRACAUDAL; PERINEURAL AS NEEDED
Status: DISCONTINUED | OUTPATIENT
Start: 2019-03-04 | End: 2019-03-04 | Stop reason: HOSPADM

## 2019-03-04 RX ORDER — KETOROLAC TROMETHAMINE 30 MG/ML
15 INJECTION, SOLUTION INTRAMUSCULAR; INTRAVENOUS EVERY 6 HOURS
Status: COMPLETED | OUTPATIENT
Start: 2019-03-04 | End: 2019-03-05

## 2019-03-04 RX ORDER — DOCUSATE SODIUM 100 MG/1
100 CAPSULE, LIQUID FILLED ORAL DAILY
Status: DISCONTINUED | OUTPATIENT
Start: 2019-03-05 | End: 2019-03-05 | Stop reason: HOSPADM

## 2019-03-04 RX ORDER — ROPIVACAINE HYDROCHLORIDE 5 MG/ML
INJECTION, SOLUTION EPIDURAL; INFILTRATION; PERINEURAL
Status: COMPLETED | OUTPATIENT
Start: 2019-03-04 | End: 2019-03-04

## 2019-03-04 RX ADMIN — SODIUM CHLORIDE 125 ML/HR: 900 INJECTION, SOLUTION INTRAVENOUS at 14:59

## 2019-03-04 RX ADMIN — HYDROMORPHONE HYDROCHLORIDE 0.5 MG: 2 INJECTION, SOLUTION INTRAMUSCULAR; INTRAVENOUS; SUBCUTANEOUS at 09:54

## 2019-03-04 RX ADMIN — SODIUM CHLORIDE, SODIUM LACTATE, POTASSIUM CHLORIDE, AND CALCIUM CHLORIDE: 600; 310; 30; 20 INJECTION, SOLUTION INTRAVENOUS at 10:30

## 2019-03-04 RX ADMIN — MULTIPLE VITAMINS W/ MINERALS TAB 1 TABLET: TAB at 14:59

## 2019-03-04 RX ADMIN — VANCOMYCIN HYDROCHLORIDE 1000 MG: 10 INJECTION, POWDER, LYOPHILIZED, FOR SOLUTION INTRAVENOUS at 21:54

## 2019-03-04 RX ADMIN — PANTOPRAZOLE SODIUM 40 MG: 40 TABLET, DELAYED RELEASE ORAL at 08:10

## 2019-03-04 RX ADMIN — DEXAMETHASONE SODIUM PHOSPHATE 8 MG: 4 INJECTION, SOLUTION INTRA-ARTICULAR; INTRALESIONAL; INTRAMUSCULAR; INTRAVENOUS; SOFT TISSUE at 08:10

## 2019-03-04 RX ADMIN — SODIUM CHLORIDE, SODIUM LACTATE, POTASSIUM CHLORIDE, AND CALCIUM CHLORIDE 125 ML/HR: 600; 310; 30; 20 INJECTION, SOLUTION INTRAVENOUS at 07:53

## 2019-03-04 RX ADMIN — TRANEXAMIC ACID 1950 MG: 650 TABLET ORAL at 08:10

## 2019-03-04 RX ADMIN — OXYCODONE HYDROCHLORIDE 5 MG: 5 TABLET ORAL at 20:18

## 2019-03-04 RX ADMIN — SODIUM CHLORIDE, SODIUM LACTATE, POTASSIUM CHLORIDE, AND CALCIUM CHLORIDE 1000 ML: 600; 310; 30; 20 INJECTION, SOLUTION INTRAVENOUS at 07:53

## 2019-03-04 RX ADMIN — ASPIRIN 81 MG: 81 TABLET ORAL at 14:59

## 2019-03-04 RX ADMIN — PROPOFOL 170 MG: 10 INJECTION, EMULSION INTRAVENOUS at 09:23

## 2019-03-04 RX ADMIN — ONDANSETRON 4 MG: 2 INJECTION INTRAMUSCULAR; INTRAVENOUS at 17:49

## 2019-03-04 RX ADMIN — ACETAMINOPHEN 650 MG: 325 TABLET ORAL at 14:59

## 2019-03-04 RX ADMIN — VANCOMYCIN HYDROCHLORIDE 1000 MG: 1 INJECTION, POWDER, LYOPHILIZED, FOR SOLUTION INTRAVENOUS at 09:02

## 2019-03-04 RX ADMIN — ASPIRIN 81 MG: 81 TABLET ORAL at 20:18

## 2019-03-04 RX ADMIN — CLINDAMYCIN PHOSPHATE 900 MG: 900 INJECTION, SOLUTION INTRAVENOUS at 08:39

## 2019-03-04 RX ADMIN — LIDOCAINE HYDROCHLORIDE 80 MG: 20 INJECTION, SOLUTION EPIDURAL; INFILTRATION; INTRACAUDAL; PERINEURAL at 09:23

## 2019-03-04 RX ADMIN — ONDANSETRON 4 MG: 2 INJECTION INTRAMUSCULAR; INTRAVENOUS at 10:18

## 2019-03-04 RX ADMIN — KETOROLAC TROMETHAMINE 15 MG: 30 INJECTION, SOLUTION INTRAMUSCULAR at 17:49

## 2019-03-04 RX ADMIN — ACETAMINOPHEN 650 MG: 325 TABLET ORAL at 23:50

## 2019-03-04 RX ADMIN — ACETAMINOPHEN 1000 MG: 500 TABLET, FILM COATED ORAL at 08:10

## 2019-03-04 RX ADMIN — VERAPAMIL HYDROCHLORIDE 120 MG: 120 TABLET, FILM COATED, EXTENDED RELEASE ORAL at 22:57

## 2019-03-04 RX ADMIN — KETOROLAC TROMETHAMINE 15 MG: 30 INJECTION, SOLUTION INTRAMUSCULAR at 14:59

## 2019-03-04 RX ADMIN — Medication 10 ML: at 21:53

## 2019-03-04 RX ADMIN — ROPIVACAINE HYDROCHLORIDE 30 ML: 5 INJECTION, SOLUTION EPIDURAL; INFILTRATION; PERINEURAL at 08:35

## 2019-03-04 RX ADMIN — PREGABALIN 50 MG: 50 CAPSULE ORAL at 08:10

## 2019-03-04 RX ADMIN — SODIUM CHLORIDE 300 ML/HR: 900 INJECTION, SOLUTION INTRAVENOUS at 12:20

## 2019-03-04 RX ADMIN — KETOROLAC TROMETHAMINE 15 MG: 30 INJECTION, SOLUTION INTRAMUSCULAR at 23:51

## 2019-03-04 RX ADMIN — HYDROMORPHONE HYDROCHLORIDE 0.5 MG: 2 INJECTION, SOLUTION INTRAMUSCULAR; INTRAVENOUS; SUBCUTANEOUS at 09:38

## 2019-03-04 RX ADMIN — ACETAMINOPHEN 650 MG: 325 TABLET ORAL at 19:10

## 2019-03-04 RX ADMIN — MIDAZOLAM HYDROCHLORIDE 4 MG: 1 INJECTION, SOLUTION INTRAMUSCULAR; INTRAVENOUS at 08:32

## 2019-03-04 NOTE — DISCHARGE SUMMARY
[address] [address]     DISCHARGE SUMMARY     PATIENT: Karyna Juan     MRN: [id number]   ADMIT DATE: 3/4/2019   [facility]LL[facility]   DISCHARGE DATE:      ATTENDING: Diamond Ohara MD   DICTATING: MYA Cotton         ADMISSION DIAGNOSIS: Osteoarthritis of left knee [M17.12]    DISCHARGE DIAGNOSIS: Status post LEFT TOTAL KNEE ARTHROPLASTY    HISTORY OF PRESENT ILLNESS: The patient is a 74y.o. year-old female   with ongoing left knee pain secondary to osteoarthritis of her left knee. The patient's pain has persisted and progressed despite conservative treatments and therapies. The patient has at this time opted for surgical intervention. PAST MEDICAL HISTORY:   Past Medical History:   Diagnosis Date    Arthritis     Dupuytren's contracture     Foot pain, left     Hypertension     Menopause     Osteoarthritis of left knee 2019    Rosacea     Unspecified sleep apnea     advised to bring CPAP day of surgery       PAST SURGICAL HISTORY:   Past Surgical History:   Procedure Laterality Date    CHEST SURGERY PROCEDURE UNLISTED      CHEST TUBE INSETION    HX BUNIONECTOMY Bilateral     HX CATARACT REMOVAL Bilateral     HX HYSTERECTOMY      HX LAP CHOLECYSTECTOMY      HX OOPHORECTOMY      HX ORTHOPAEDIC Left     foot    HX ORTHOPAEDIC Bilateral     bunionectomy    HX TONSILLECTOMY      JANUSZ BIOPSY BREAST STEREOTACTIC Left 8 years about    done at [facility]       ALLERGIES:   Allergies   Allergen Reactions    Ancef [Cefazolin] Anaphylaxis    Sulfa (Sulfonamide Antibiotics) Itching and Swelling        CURRENT MEDICATIONS:  A list of medications prior to the time of admission include:  Prior to Admission medications    Medication Sig Start Date End Date Taking? Authorizing Provider   pravastatin (PRAVACHOL) 40 mg tablet Take 40 mg by mouth nightly.    Yes Provider, Historical   metroNIDAZOLE (NORITATE) 1 % topical cream Apply  to affected area daily. Use a thin layer to affected areas after washing   Yes Provider, Historical   cycloSPORINE (RESTASIS) 0.05 % dpet Administer 1 Drop to both eyes every twelve (12) hours. Yes Provider, Historical   aspirin delayed-release 81 mg tablet Take 1 Tab by mouth two (2) times a day for 21 days. 3/4/19 3/25/19 Yes Susan Guajardo PA   meloxicam (MOBIC) 7.5 mg tablet Take 1 Tab by mouth two (2) times a day for 14 days. 3/4/19 3/18/19 Yes Susan Guajardo PA   oxyCODONE-acetaminophen (PERCOCET) 5-325 mg per tablet Take 1-2 Tabs by mouth every four (4) hours as needed for Pain for up to 5 days. Max Daily Amount: 12 Tabs. Take 1-2 tablets by mouth every 4-6 hours as needed for pain. 3/4/19 3/9/19 Yes Susan Guajardo PA   verapamil ER (VERELAN PM) 100 mg capsule Take 100 mg by mouth nightly. Yes Provider, Historical   multivitamin (ONE A DAY) tablet Take 1 Tab by mouth daily. Yes Provider, Historical   estradiol (ESTRACE) 0.5 mg tablet Take  by mouth daily. Yes Provider, Historical   acetaminophen (TYLENOL EXTRA STRENGTH) 500 mg tablet Take 1,000 mg by mouth every six (6) hours as needed for Pain. Yes Provider, Historical   clobetasol (TEMOVATE) 0.05 % topical cream Apply  to affected area two (2) times a week. Provider, Historical   fluticasone propionate (FLONASE ALLERGY RELIEF NA) by Nasal route as needed.     Provider, Historical   estradiol (VAGIFEM) 10 mcg tab vaginal tablet I ONE T VAG  TWICE WEEKLY 12/5/17   Other, MD Radha       FAMILY HISTORY:   Family History   Problem Relation Age of Onset    Hypertension Mother     Heart Disease Mother        SOCIAL HISTORY:   Social History     Socioeconomic History    Marital status:      Spouse name: Not on file    Number of children: Not on file    Years of education: Not on file    Highest education level: Not on file   Tobacco Use    Smoking status: Never Smoker    Smokeless tobacco: Never Used   Substance and Sexual Activity    Alcohol use: Yes     Comment: monthly    Drug use: No       REVIEW OF SYSTEMS: All review of systems are negative. PHYSICAL EXAMINATION: For a detailed physical exam, please refer to the patient's chart. HOSPITAL COURSE: The patient was taken to surgery the day of admission. she underwent a left total knee replacement. Operative course was benign. Estimated blood loss was approximately 150 cc. The patient was taken to the PACU in stable condition and was later taken to the floor in stable condition. During her hospital stay, the patient progressed well with physical therapy and occupational therapy, adherent to instructions. she had been cleared by physical therapy with stair training. she was placed on Aspirin for DVT prophylaxis. her pain has been well controlled with oral pain medications. her vitals have remained stable. she has also remained hemodynamically stable. The patient has been recommended for discharge home. DISCHARGE INSTRUCTIONS: The patient is to be discharged home with the following medication changes:   Current Discharge Medication List      START taking these medications    Details   aspirin delayed-release 81 mg tablet Take 1 Tab by mouth two (2) times a day for 21 days. Qty: 42 Tab, Refills: 0      meloxicam (MOBIC) 7.5 mg tablet Take 1 Tab by mouth two (2) times a day for 14 days. Qty: 28 Tab, Refills: 0      oxyCODONE-acetaminophen (PERCOCET) 5-325 mg per tablet Take 1-2 Tabs by mouth every four (4) hours as needed for Pain for up to 5 days. Max Daily Amount: 12 Tabs. Take 1-2 tablets by mouth every 4-6 hours as needed for pain. Qty: 60 Tab, Refills: 0    Associated Diagnoses: Primary osteoarthritis of left knee         CONTINUE these medications which have NOT CHANGED    Details   pravastatin (PRAVACHOL) 40 mg tablet Take 40 mg by mouth nightly. metroNIDAZOLE (NORITATE) 1 % topical cream Apply  to affected area daily.  Use a thin layer to affected areas after washing      cycloSPORINE (RESTASIS) 0.05 % dpet Administer 1 Drop to both eyes every twelve (12) hours. verapamil ER (VERELAN PM) 100 mg capsule Take 100 mg by mouth nightly. multivitamin (ONE A DAY) tablet Take 1 Tab by mouth daily. clobetasol (TEMOVATE) 0.05 % topical cream Apply  to affected area two (2) times a week. fluticasone propionate (FLONASE ALLERGY RELIEF NA) by Nasal route as needed. STOP taking these medications       estradiol (ESTRACE) 0.5 mg tablet Comments:   Reason for Stopping:         acetaminophen (TYLENOL EXTRA STRENGTH) 500 mg tablet Comments:   Reason for Stopping:         estradiol (VAGIFEM) 10 mcg tab vaginal tablet Comments:   Reason for Stopping:                   The patient is to continue at home with home physical therapy 3 times a week to work on gait training, range of motion, strengthening, and weightbearing exercises as tolerated on the left lower extremity. The patient is to progress from a walker to a cane to complete total weightbearing as tolerable. The patient is to continue to keep her incision dry. The patient is to followup with Dr. Nathanael Levin, Kristin Willis PA-C and/or Jerman Tripathi PA-C in the office approximately 10-14 days status post for x-rays and further evaluation.     MYA Barahona  3/5/2019

## 2019-03-04 NOTE — PROGRESS NOTES
Problem: Mobility Impaired (Adult and Pediatric) Goal: *Acute Goals and Plan of Care (Insert Text) Goals to be addressed in 1-4 days: STG 
1. Rolling L to R to L modified independent for positioning. 2. Supine to sit to supine S with HR for meals. 3. Sit to stand to sit S with RW in prep for ambulation. LTG 1. Ambulate >150ft S with RW, WBAT, for home/community mobility. 2. Ascend/descend a >3 stair steps CGA with HR for home entry. 3. Tolerate up in chair 1-2 hours for ADLs. 4. Patient/family to be independent with HEP for follow-up care and safe discharge. Outcome: Progressing Towards Goal 
physical Therapy EVALUATION Patient: Rob Pacheco (80 y.o. female) Date: 3/4/2019 Primary Diagnosis: Osteoarthritis of left knee [M17.12] Procedure(s) (LRB): LEFT TOTAL KNEE REPLACEMENT (Left) Day of Surgery Precautions:   Fall, WBAT 
 
ASSESSMENT : 
Based on the objective data described below, the patient presents with lower extremity weakness, decreased gait quality and endurance, impaired bed mobility and transfers, decreased L knee ROM/flexibility, and overall limitations in functional mobility s/p L TKR. Pt performed supine to sit with CGA, sit to stand with CG-Jayne. Patient ambulated 100 feet with RW, GB applied, CGA. Pt tolerated session well as evidenced by no c/o increased pain, mild lightheadedness and nausea at end of session. SPO2 >97% on RA. Patient would benefit from skilled inpatient physical therapy to address deficits, progress as tolerated to achieve long term goals and allow safe discharge. Patient will benefit from skilled intervention to address the above impairments. Patients rehabilitation potential is considered to be Good Factors which may influence rehabilitation potential include:  
[]         None noted 
[]         Mental ability/status [x]         Medical condition 
[]         Home/family situation and support systems 
[]         Safety awareness [x]         Pain tolerance/management 
[]         Other: PLAN : 
Recommendations and Planned Interventions: 
[x]           Bed Mobility Training             [x]    Neuromuscular Re-Education 
[x]           Transfer Training                   []    Orthotic/Prosthetic Training 
[x]           Gait Training                          []    Modalities [x]           Therapeutic Exercises          []    Edema Management/Control 
[x]           Therapeutic Activities            [x]    Patient and Family Training/Education 
[]           Other (comment): Frequency/Duration: Patient will be followed by physical therapy twice daily to address goals. Discharge Recommendations: Home Health Further Equipment Recommendations for Discharge: N/A  
 
SUBJECTIVE:  
Patient stated I am feeling ok.  OBJECTIVE DATA SUMMARY:  
 
Past Medical History:  
Diagnosis Date  Arthritis  Dupuytren's contracture  Foot pain, left  Hypertension 2018  Menopause  Osteoarthritis of left knee 2/28/2019  Rosacea  Unspecified sleep apnea   
 advised to bring CPAP day of surgery Past Surgical History:  
Procedure Laterality Date  CHEST SURGERY PROCEDURE UNLISTED    
 CHEST TUBE INSETION  
 HX BUNIONECTOMY Bilateral   
 HX CATARACT REMOVAL Bilateral   
 [address]  HX LAP CHOLECYSTECTOMY  HX OOPHORECTOMY  HX ORTHOPAEDIC Left   
 foot  HX ORTHOPAEDIC Bilateral   
 bunionectomy  HX TONSILLECTOMY  JANUSZ BIOPSY BREAST STEREOTACTIC Left 8 years about  
 done at [facility] Barriers to Learning/Limitations: None Compensate with: N/A Prior Level of Function/Home Situation: Independent amb s/AD Home Situation Home Environment: Private residence # Steps to Enter: 4 Rails to Enter: Yes Hand Rails : Right One/Two Story Residence: One story Living Alone: No 
Support Systems: Spouse/Significant Other/Partner Patient Expects to be Discharged to[de-identified] Private residence Current DME Used/Available at Home: Amedeo Grave, rollingCritical Behavior: 
Neurologic State: Alert; Appropriate for age Psychosocial 
Purposeful Interaction: Yes Pt Identified Daily Priority: Clinical issues (comment) [facility] Process: Nurture loving kindness;Establish trust;Teaching/learning;Create healing environment Caring Interventions: Reassure Reassure: Therapeutic listening; Acceptance;Caring roundsSkin Condition/Temp: Dry;Warm 
Skin Integumentary Skin Color: Appropriate for ethnicity Skin Condition/Temp: Dry;Warm 
Strength:   
Strength: Generally decreased, functional 
Tone & Sensation:  
Tone: Normal 
Sensation: Intact Range Of Motion: 
AROM: Generally decreased, functional 
PROM: Generally decreased, functional 
Functional Mobility: 
Bed Mobility: 
Supine to Sit: Contact guard assistance Sit to Supine: Contact guard assistance Transfers: 
Sit to Stand: Contact guard assistance;Minimum assistance Stand to Sit: Contact guard assistance Balance:  
Sitting: Intact Standing: Intact; With supportAmbulation/Gait Training: 
Distance (ft): 100 Feet (ft) Assistive Device: Gait belt;Walker, rolling Ambulation - Level of Assistance: Contact guard assistance Gait Description (WDL): Exceptions to [facility] Gait Abnormalities: Decreased step clearance; Antalgic; Step to gait Left Side Weight Bearing: As tolerated Base of Support: Shift to right Stance: Left decreased Speed/Catherine: Slow Step Length: Right shortened;Left shortened Pain: 
Pain Scale 1: Numeric (0 - 10) Pain Intensity 1: 4 Pain Location 1: Back Pain Orientation 1: Lower Pain Description 1: Aching Pain Intervention(s) 1: Medication (see MAR); Ice(scheduled toradol) Activity Tolerance:  
Good Please refer to the flowsheet for vital signs taken during this treatment. After treatment:  
[]         Patient left in no apparent distress sitting up in chair 
[x]         Patient left in no apparent distress in bed [x]         Call bell left within reach [x]         Nursing notified 
[]         Caregiver present 
[]         Bed alarm activated COMMUNICATION/EDUCATION:  
[x]         Fall prevention education was provided and the patient/caregiver indicated understanding. [x]         Patient/family have participated as able in goal setting and plan of care. [x]         Patient/family agree to work toward stated goals and plan of care. []         Patient understands intent and goals of therapy, but is neutral about his/her participation. []         Patient is unable to participate in goal setting and plan of care. Thank you for this referral. 
Xenia Jauregui Time Calculation: 31 mins Eval Complexity: History: MEDIUM  Complexity : 1-2 comorbidities / personal factors will impact the outcome/ POC Exam:LOW Complexity : 1-2 Standardized tests and measures addressing body structure, function, activity limitation and / or participation in recreation  Presentation: LOW Complexity : Stable, uncomplicated  Clinical Decision Making:Low Complexity amb >30 ft c/RW, CGA for safety Overall Complexity:LOW

## 2019-03-04 NOTE — PERIOP NOTES
TRANSFER - IN REPORT: 
 
Verbal report received from ORN 7 CRNA on Carmen Hill  being received from OR(unit) for routine post - op Report consisted of patients Situation, Background, Assessment and  
Recommendations(SBAR). Information from the following report(s) SBAR was reviewed with the receiving nurse. Opportunity for questions and clarification was provided. Assessment completed upon patients arrival to unit and care assumed.

## 2019-03-04 NOTE — PERIOP NOTES
Verbal hand off at the bedside with [facility], RN, dual skin assessment. The family member was updated. CPAP Machine brought over to the room.

## 2019-03-04 NOTE — PERIOP NOTES
Cassidy Parada gave verbal orders to start Clindamycin 900 mg then once that is completed start Vancomycin 1 G

## 2019-03-04 NOTE — PERIOP NOTES
I called the floor to get a nurse assignment spoke with Tyra Atkins. Adryan Torres is getting this patient, informed SBAR is ready for review.

## 2019-03-04 NOTE — INTERVAL H&P NOTE
H&P Update: 
Usha Sandoval was seen and examined. History and physical has been reviewed. The patient has been examined.  There have been no significant clinical changes since the completion of the originally dated History and Physical. 
 
Signed By: Alex Alonso MD   
 March 4, 2019 7:12 AM

## 2019-03-04 NOTE — ANESTHESIA PREPROCEDURE EVALUATION
Anesthetic History No history of anesthetic complications Review of Systems / Medical History Patient summary reviewed, nursing notes reviewed and pertinent labs reviewed Pulmonary Sleep apnea: CPAP Neuro/Psych Within defined limits Cardiovascular Hypertension: well controlled GI/Hepatic/Renal 
  
GERD: well controlled Comments: Diet related - prn famotidine. Endo/Other Arthritis Other Findings Physical Exam 
 
Airway Mallampati: II 
TM Distance: 4 - 6 cm Neck ROM: normal range of motion Mouth opening: Normal 
 
 Cardiovascular Rhythm: regular Rate: normal 
 
 
 
 Dental 
 
Dentition: Caps/crowns Pulmonary Breath sounds clear to auscultation Abdominal 
GI exam deferred Other Findings Anesthetic Plan ASA: 2 Anesthesia type: general 
 
 
Post-op pain plan if not by surgeon: peripheral nerve block single Induction: Intravenous Anesthetic plan and risks discussed with: Patient and Family R/B discussed including nerve injury.

## 2019-03-04 NOTE — PROGRESS NOTES
Problem: Falls - Risk of 
Goal: *Absence of Falls Document Fabian oDwlings Fall Risk and appropriate interventions in the flowsheet. Outcome: Progressing Towards Goal 
Fall Risk Interventions: 
Mobility Interventions: Patient to call before getting OOB, PT Consult for mobility concerns, PT Consult for assist device competence, Strengthening exercises (ROM-active/passive), Utilize walker, cane, or other assistive device Mentation Interventions: Adequate sleep, hydration, pain control Medication Interventions: Patient to call before getting OOB, Teach patient to arise slowly Elimination Interventions: Call light in reach, Elevated toilet seat, Patient to call for help with toileting needs, Toilet paper/wipes in reach, Toileting schedule/hourly rounds

## 2019-03-04 NOTE — ANESTHESIA PROCEDURE NOTES
Peripheral Block Start time: 3/4/2019 8:31 AM 
End time: 3/4/2019 8:36 AM 
Performed by: Jalen Oleary MD 
Authorized by: Jalen Oleary MD  
 
 
Pre-procedure: Indications: at surgeon's request, post-op pain management and procedure for pain Preanesthetic Checklist: patient identified, risks and benefits discussed, site marked, timeout performed, anesthesia consent given and patient being monitored Timeout Time: 08:31 Block Type:  
Block Type: Adductor canal 
Laterality:  Left Monitoring:  Standard ASA monitoring, responsive to questions, oxygen, frequent vital sign checks, heart rate and continuous pulse ox Injection Technique:  Single shot Procedures: ultrasound guided Patient Position: supine Prep: chlorhexidine Location:  Mid thigh Needle Type:  Stimuplex Needle Gauge:  21 G Needle Localization:  Ultrasound guidance Assessment: 
Number of attempts:  1 Injection Assessment:  Incremental injection every 5 mL, negative aspiration for CSF, no paresthesia, ultrasound image on chart, local visualized surrounding nerve on ultrasound, negative aspiration for blood and no intravascular symptoms Patient tolerance:  Patient tolerated the procedure well with no immediate complications

## 2019-03-04 NOTE — ANESTHESIA POSTPROCEDURE EVALUATION
. 
Post-Anesthesia Evaluation & Assessment Visit Vitals /80 Pulse 94 Temp 37.3 °C (99.1 °F) Resp 12 Ht 5' 2\" (1.575 m) Wt 69.7 kg (153 lb 9.6 oz) SpO2 95% BMI 28.09 kg/m² Nausea/Vomiting: no nausea Post-operative hydration adequate. Pain score (VAS): 0 Mental status & Level of consciousness: alert and oriented x 3 Neurological status: moves all extremities, sensation grossly intact Pulmonary status: airway patent, no supplemental oxygen required Complications related to anesthesia: none Additional comments:

## 2019-03-04 NOTE — PERIOP NOTES
TRANSFER - OUT REPORT: 
 
Verbal report given to [facility], RN on Milady Singer  being transferred to [address] [place] [address] ([facility]) for routine post - op Report consisted of patients Situation, Background, Assessment and  
Recommendations(SBAR). Information from the following report(s) SBAR was reviewed with the receiving nurse. Lines:  
Peripheral IV 03/04/19 Left Forearm (Active) Site Assessment Clean, dry, & intact 3/4/2019 11:03 AM  
Phlebitis Assessment 0 3/4/2019 11:03 AM  
Infiltration Assessment 0 3/4/2019 11:03 AM  
Dressing Status Clean, dry, & intact 3/4/2019 11:03 AM  
Dressing Type Tape 3/4/2019 11:03 AM  
Hub Color/Line Status Infusing 3/4/2019 11:03 AM  
  
 
Opportunity for questions and clarification was provided. Patient transported with: 
 Registered Nurse CPAP Machine

## 2019-03-04 NOTE — PERIOP NOTES
Date 03/03/19 0700 - 03/04/19 0659(Not Admitted) 03/04/19 0700 - 03/05/19 2800 Shift [id number] 1900-0659 24 Hour Total 2924-6947 4154-8039 24 Hour Total  
INTAKE  
P.O.    0  0  
  P. O.    0  0 I.V.    2250  2250 I.V.    1650  0334 Volume (lactated Ringers infusion)    600  600 Shift Total(mL/kg)    6938(79.3)  5055(20.8) OUTPUT Blood    150  150 Estimated Blood Loss    150  150 Shift Total(mL/kg)    150(2.2)  150(2.2) NET    2100 2100 Weight (kg)  69.7 69.7 69.7 69.7 69.7

## 2019-03-04 NOTE — PERIOP NOTES
Denies pain or discomfort dressing clean dry and intact. Palpable pulses and brisk capillary refill.

## 2019-03-04 NOTE — DISCHARGE INSTRUCTIONS
[address] [place] [address] Sports Medicine   Patient Discharge Instructions    Claude Castilla / [id number] : 1945    Admitted 3/4/2019 Discharged: 3/4/2019     IF YOU HAVE ANY PROBLEMS ONCE YOU ARE AT HOME CALL THE FOLLOWING NUMBERS:   Main office number: [phone]    Your follow up appointment to see either Dr. General Nate MONTALVO, or [facility] as scheduled in 2 weeks. If you are unsure of your appointment date call the office at [phone]. Medication Instructions     · Resume your home medictions as directed, you may have directed not to resume supplements until after your follow up. · A prescription for pain medication has been given   · It is important that you take the medication exactly as they are prescribed. · Keep your medication in the bottles provided by the pharmacist and keep a list of the medication names, dosages, and times to be taken in your wallet. · Do not take other medications without consulting your doctor. What to do at [address] [place] [address] Ave your prehospital diet. If you have excessive nausea or vomitting call your doctor's office. Be sure to maintain adequate fluid intake. Some pain medications may cause constipation. Remember to drink fluids, stay as active as possible, and eat plenty of fiber-rich foods. Begin In-Home Physical Therapy; 3 times a week to work on gait training, range of motion, strengthening, and weight bearing exercises as tolerable. Continue to use your walker or cane when walking. May progress from the walker to a cane to complete total bearing as tolerable. Patient may shower. Wrap incision with plastic wrap/covering to prevent incision from getting wet. Avoid complete immersion. YOUR DRESSING SHOULD BE CHANGED IN 3-5 DAYS BY [facility] NURSE.       When to Call    - Call if you have a temperature greater then 101  - Unable to keep food down  - Are unable to bear any wieght   - Need a pain medication refill     Information obtained by :  I understand that if any problems occur once I am at home I am to contact my physician. I understand and acknowledge receipt of the instructions indicated above.                                                                                                                                            Physician's or R.N.'s Signature                                                                  Date/Time                                                                                                                                              Patient or Representative Signature                                                          Date/Time

## 2019-03-04 NOTE — OP NOTES
[address] [facility] [address] Medicine  Total Knee Arthroplasty    Patient: Claude Castilla MRN: [id number]  SSN: [id number]    YOB: 1945  Age: 74 y.o. Sex: female      Date of Surgery: 3/4/2019   Preoperative Diagnosis: UNILATERAL PRIMARY OSTEOARTHRITIS, LEFT KNEE   Postoperative Diagnosis: UNILATERAL PRIMARY OSTEOARTHRITIS, LEFT KNEE   Location: [facility]  Surgeon: Enoc Carpio MD  Assistant: Hi Asif PA-C    Anesthesia: General and adductor canal Nerve Block    Procedure: Total Knee Arthroplasty: orthodevelopment BKS trimax   The complexity of the total joint surgery requires the use of a first assistant for positioning, retraction and assistance in closure. Tourniquet Time: Tourniquet not used. Estimated Blood Loss: Less than 100cc     Implants:   Implant Name Type Inv. Item Serial No.  Lot No. LRB No. Used Action   CEMENT BNE SMARTSET HV 40GM -- ORDER IN SETS OF 20 - [id number]  CEMENT BNE SMARTSET HV 40GM -- ORDER IN SETS OF 20  [employer] [id number] Left 2 Implanted   FEMORAL     ORTHO DEVELOPMENT [id number] Left 1 Implanted   TIBIAL TRAY     ORTHO DEVELOPMENT [id number] Left 1 Implanted   TIBIAL INSERT     ORTHO DEVELOPMENT [id number] Left 1 Implanted   PATELLA    ORTHO DEVELOPMENT [id number] Left 1 Implanted        Specimens: None    Additional Findings: None     Complications: none    Body Mass Index: Body mass index is 28.09 kg/m². Procedure Detail:  Prior to the surgery the patient was administered a femoral nerve block in the preoperative holding area by the anesthesiologist. Claude Castilla was brought to the operating room and positioned on the operating table. She was anesthetized with anesthesia. Intravenous antibiotics were administered. Prior to the incision being made a timeout was called identifying the patient, procedure, operative side, and surgeon.  A pneumatic tourniquet was placed about the limb and the left leg was prepped and draped in the usual sterile manner. The tourniquet was not inflated throughout the case. A midline anterior incision made over the knee. The incision was carried down through the subcutaneous tissue to the underlying capsule. A medial parapatellar capsular incision was performed. The medial capsular flap was carefully elevated around to the posterior medial corner protecting the medial collateral ligaments and the fibers. The patella was sized with a caliper, and approximately 10-12 mm was resected with an oscillating saw allowing the patella to be slid into the lateral gutter. It was not everted throughout the case. Our attention was first turned to the distal femur and using intermedullary instrumentation, a 5-degree valgus cut was on the distal end of the femur. The distal end of the femur was sized to a size 5 femoral component. Pins were inserted through the sizer and the corresponding 4-in-1 block was slid into place and pinned for stability. Anterior and posterior and chamfer cuts were made to accommodate the femoral component. The medial and lateral menisci were excised as were the anterior cruciate ligaments. Our attention was then turned to the tibia. Using extramedullary instrumentation, a 3-degree cut was made on the proximal end of the tibia. A spacer block was placed to show gaps had been balanced and a size 4  tibial base plate was placed on the tibia and pinned into place. Intramedullary reaming guide was placed on the tibia and the appropriate reamer was used followed by the keel punch to complete the preparation of the tibia. A trial femoral component was then impacted on to the distal end of the femur. Trial reduction was then performed with incremental size trial bearing surfaces. The orthodevelopment BKS trimax CR 10mm bearing surface was inserted and allowed for full extension, good medial, lateral stability at 90 degrees of flexion, especially medially.     Our attention was then turned to the patella. The patella was sized to a 32mm patella. The guide was pinned, placed over patella, 3 holes were drilled. Trial patella button was inserted and the patella was reduced in the knee. The patella tracked normally using no-touch technique. The trial components were then all removed. The real components were opened on the back. The cut surfaces of the bone were prepared using the PulsaVac lavage. Prior to this, the Aquamantys was used to cauterize any soft tissue bleeding. 40 grams of Depuy HV cement was mixed. The femoral and tibial components  and patellar component was cemented into place. Excess cement was removed from around the edge of bone using plastic curette. Once the cement had hardened, the knee was placed through range of motion and noted to be stable as mentioned above with the trail components. The wound was dry, therefore no drain was used. The operative knee was injected with 20 cc of exparel. The knee was then soaked with a diluted betadine solution for approximately 3 min. This was then thoroughly irrigated. The capsular layer was closed using a #2 stratafix suture with the knee flexed 90 degrees, while subcutaneous layers were closed using 2-0 Vicryl suture. Finally the skin was closed using Prineo, which were applied in occlusive fashion and sterile bandage applied. An Iceman cryotherapy pad was applied on the operative leg. Sponge count and needle counts were correct. She was taken to the recovery room extubated in stable condition.     Signed By: Maliha Rodriguez MD     March 4, 2019

## 2019-03-05 ENCOUNTER — HOME HEALTH ADMISSION (OUTPATIENT)
Dept: HOME HEALTH SERVICES | Facility: HOME HEALTH | Age: 74
End: 2019-03-05
Payer: COMMERCIAL

## 2019-03-05 VITALS
HEIGHT: 62 IN | SYSTOLIC BLOOD PRESSURE: 121 MMHG | WEIGHT: 153.6 LBS | OXYGEN SATURATION: 93 % | BODY MASS INDEX: 28.26 KG/M2 | RESPIRATION RATE: 16 BRPM | TEMPERATURE: 98.3 F | DIASTOLIC BLOOD PRESSURE: 65 MMHG | HEART RATE: 74 BPM

## 2019-03-05 LAB
ANION GAP SERPL CALC-SCNC: 6 MMOL/L (ref 3–18)
BASOPHILS # BLD: 0 K/UL (ref 0–0.1)
BASOPHILS NFR BLD: 0 % (ref 0–2)
BUN SERPL-MCNC: 11 MG/DL (ref 7–18)
BUN/CREAT SERPL: 19 (ref 12–20)
CALCIUM SERPL-MCNC: 8.1 MG/DL (ref 8.5–10.1)
CHLORIDE SERPL-SCNC: 102 MMOL/L (ref 100–108)
CO2 SERPL-SCNC: 27 MMOL/L (ref 21–32)
CREAT SERPL-MCNC: 0.58 MG/DL (ref 0.6–1.3)
DIFFERENTIAL METHOD BLD: ABNORMAL
EOSINOPHIL # BLD: 0 K/UL (ref 0–0.4)
EOSINOPHIL NFR BLD: 0 % (ref 0–5)
ERYTHROCYTE [DISTWIDTH] IN BLOOD BY AUTOMATED COUNT: 13.9 % (ref 11.6–14.5)
GLUCOSE SERPL-MCNC: 128 MG/DL (ref 74–99)
HCT VFR BLD AUTO: 30.9 % (ref 35–45)
HGB BLD-MCNC: 9.9 G/DL (ref 12–16)
LYMPHOCYTES # BLD: 0.7 K/UL (ref 0.9–3.6)
LYMPHOCYTES NFR BLD: 8 % (ref 21–52)
MCH RBC QN AUTO: 29.6 PG (ref 24–34)
MCHC RBC AUTO-ENTMCNC: 32 G/DL (ref 31–37)
MCV RBC AUTO: 92.2 FL (ref 74–97)
MONOCYTES # BLD: 0.8 K/UL (ref 0.05–1.2)
MONOCYTES NFR BLD: 9 % (ref 3–10)
NEUTS SEG # BLD: 6.9 K/UL (ref 1.8–8)
NEUTS SEG NFR BLD: 83 % (ref 40–73)
PLATELET # BLD AUTO: 188 K/UL (ref 135–420)
PMV BLD AUTO: 9.7 FL (ref 9.2–11.8)
POTASSIUM SERPL-SCNC: 4.1 MMOL/L (ref 3.5–5.5)
RBC # BLD AUTO: 3.35 M/UL (ref 4.2–5.3)
SODIUM SERPL-SCNC: 135 MMOL/L (ref 136–145)
WBC # BLD AUTO: 8.3 K/UL (ref 4.6–13.2)

## 2019-03-05 PROCEDURE — 36415 COLL VENOUS BLD VENIPUNCTURE: CPT

## 2019-03-05 PROCEDURE — 97116 GAIT TRAINING THERAPY: CPT

## 2019-03-05 PROCEDURE — 80048 BASIC METABOLIC PNL TOTAL CA: CPT

## 2019-03-05 PROCEDURE — 74011250637 HC RX REV CODE- 250/637: Performed by: PHYSICIAN ASSISTANT

## 2019-03-05 PROCEDURE — 97165 OT EVAL LOW COMPLEX 30 MIN: CPT

## 2019-03-05 PROCEDURE — 85025 COMPLETE CBC W/AUTO DIFF WBC: CPT

## 2019-03-05 PROCEDURE — 74011250637 HC RX REV CODE- 250/637: Performed by: ORTHOPAEDIC SURGERY

## 2019-03-05 PROCEDURE — 74011250636 HC RX REV CODE- 250/636: Performed by: PHYSICIAN ASSISTANT

## 2019-03-05 RX ADMIN — FERROUS SULFATE TAB 325 MG (65 MG ELEMENTAL FE) 325 MG: 325 (65 FE) TAB at 08:27

## 2019-03-05 RX ADMIN — KETOROLAC TROMETHAMINE 15 MG: 30 INJECTION, SOLUTION INTRAMUSCULAR at 06:01

## 2019-03-05 RX ADMIN — PANTOPRAZOLE SODIUM 40 MG: 40 TABLET, DELAYED RELEASE ORAL at 08:27

## 2019-03-05 RX ADMIN — MULTIPLE VITAMINS W/ MINERALS TAB 1 TABLET: TAB at 08:27

## 2019-03-05 RX ADMIN — ASPIRIN 81 MG: 81 TABLET ORAL at 08:26

## 2019-03-05 RX ADMIN — OXYCODONE HYDROCHLORIDE 5 MG: 5 TABLET ORAL at 11:28

## 2019-03-05 RX ADMIN — OXYCODONE HYDROCHLORIDE 5 MG: 5 TABLET ORAL at 01:23

## 2019-03-05 RX ADMIN — DOCUSATE SODIUM 100 MG: 100 CAPSULE, LIQUID FILLED ORAL at 08:27

## 2019-03-05 RX ADMIN — ACETAMINOPHEN 650 MG: 325 TABLET ORAL at 06:01

## 2019-03-05 RX ADMIN — OXYCODONE HYDROCHLORIDE 5 MG: 5 TABLET ORAL at 06:01

## 2019-03-05 RX ADMIN — Medication 10 ML: at 06:01

## 2019-03-05 RX ADMIN — VANCOMYCIN HYDROCHLORIDE 1000 MG: 10 INJECTION, POWDER, LYOPHILIZED, FOR SOLUTION INTRAVENOUS at 09:38

## 2019-03-05 RX ADMIN — ACETAMINOPHEN 650 MG: 325 TABLET ORAL at 11:28

## 2019-03-05 NOTE — PROGRESS NOTES
Problem: Self Care Deficits Care Plan (Adult)  Goal: *Acute Goals and Plan of Care (Insert Text)  Outcome: Resolved/Met Date Met: 03/05/19  Occupational Therapy EVALUATION/discharge    Patient: Les Amador (35 y.o. female)  Date: 3/5/2019  Primary Diagnosis: Osteoarthritis of left knee [M17.12]  Procedure(s) (LRB):  LEFT TOTAL KNEE REPLACEMENT (Left) 1 Day Post-Op   Precautions:   Fall, WBAT    ASSESSMENT AND RECOMMENDATIONS:  Based on the objective data described below, the patient presents with ability to perform ADL tasks at baseline level. Pt was sitting in chair upon arrival; pleasant and cooperative. Pt performed UB/LB dressing with Setup/S using adaptive strategy and extended time. Pt performed functional transfers and mobility within room/bathroom with RW and S. Pt was able to stand at sink with RW and S to complete grooming tasks. Pt was educated and verbalized understanding of home safety. Pt was left seated in chair with call bell and table tray within reach, and all needs met. Pt with no further OT/ADL concerns at this time. Skilled occupational therapy is not indicated at this time. Education: Reviewed home safety, body mechanics, importance of moving every hour to prevent joint stiffness, role of ice for edema/pain control, Rolling Walker management/safety, and adaptive dressing techniques with patient verbalizing  understanding at this time      Discharge Recommendations: none  Further Equipment Recommendations for Discharge: none      SUBJECTIVE:   Patient stated I'm doing good.     OBJECTIVE DATA SUMMARY:     Past Medical History:   Diagnosis Date    Arthritis     Dupuytren's contracture     Foot pain, left     Hypertension 2018    Menopause     Osteoarthritis of left knee 2/28/2019    Rosacea     Unspecified sleep apnea     advised to bring CPAP day of surgery     Past Surgical History:   Procedure Laterality Date    CHEST SURGERY PROCEDURE UNLISTED      CHEST TUBE INSETION    HX BUNIONECTOMY Bilateral     HX CATARACT REMOVAL Bilateral     HX HYSTERECTOMY  1994    HX LAP CHOLECYSTECTOMY      HX OOPHORECTOMY      HX ORTHOPAEDIC Left     foot    HX ORTHOPAEDIC Bilateral     bunionectomy    HX TONSILLECTOMY      JANUSZ BIOPSY BREAST STEREOTACTIC Left 8 years about    done at [facility] Ben[facility]     Barriers to Learning/Limitations: None  Compensate with: visual, verbal, tactile, kinesthetic cues/model    Prior Level of Function/Home Situation: Pt was Ind with ADLs/IADLs prior. Home Situation  Home Environment: Private residence  # Steps to Enter: 4  Rails to Enter: Yes  Hand Rails : Right  One/Two Story Residence: One story  Living Alone: No  Support Systems: Spouse/Significant Other/Partner  Patient Expects to be Discharged to[de-identified] Private residence  Current DME Used/Available at Home: Commode, bedside, Shower chair, Walker, rolling  Tub or Shower Type: Shower    Cognitive/Behavioral Status:  Neurologic State: Alert; Appropriate for age  Orientation Level: Oriented X4  Cognition: Appropriate decision making; Appropriate for age attention/concentration; Appropriate safety awareness; Follows commands  Safety/Judgement: Awareness of environment; Fall prevention;Good awareness of safety precautions; Home safety    Skin: L knee incision w/ Mepilex    Edema: compression hose in place     Coordination:  Coordination: Within functional limits  Fine Motor Skills-Upper: Left Intact; Right Intact    Gross Motor Skills-Upper: Left Intact; Right Intact    Balance:  Sitting: Intact  Standing: Intact; With support    Strength:  BUE  Strength:  Within functional limits      Tone & Sensation:  BUE  Tone: Normal  Sensation: Intact      Range of Motion:  BUE  AROM: Within functional limits  PROM: Within functional limits     Functional Mobility and Transfers for ADLs:  Transfers:  Sit to Stand: Supervision        Bathroom Mobility: Supervision/set up    ADL Assessment:  Feeding: Independent    Oral Facial Hygiene/Grooming: Supervision    Upper Body Dressing: Setup;Supervision    Lower Body Dressing: Setup;Supervision    Toileting: Supervision     ADL Intervention:  Feeding  Feeding Assistance: Independent    Grooming  Grooming Assistance: Supervision/set up(standing at sink with RW)  Brushing Teeth: Supervision/set-up    Upper Body Dressing Assistance  Dressing Assistance: Supervision/set-up  Bra: Supervision/set-up  Pullover Shirt: Supervision/set-up    Lower Body Dressing Assistance  Dressing Assistance: Supervision/set up  Underpants: Supervision/set-up; Compensatory technique training  Pants With Elastic Waist: Supervision/set-up; Compensatory technique training  Position Performed: Bending forward method;Seated in chair  Cues: Don;Verbal cues provided  Adaptive Equipment Used: Walker    Toileting  Clothing Management: Supervision/set-up    Cognitive Retraining  Safety/Judgement: Awareness of environment; Fall prevention;Good awareness of safety precautions; Home safety    Activity Tolerance:   Pt overall tolerated session well with no signs of fatigue or distress. Please refer to the flowsheet for vital signs taken during this treatment. After treatment:   [x]  Patient left in no apparent distress sitting up in chair  []  Patient left in no apparent distress in bed  [x]  Call bell left within reach  [x]  Nursing notified  []  Caregiver present  []  Bed alarm activated    COMMUNICATION/EDUCATION:   Communication/Collaboration:  [x]      Home safety education was provided and the patient/caregiver indicated understanding. [x]      Patient/family have participated as able and agree with findings and recommendations. []      Patient is unable to participate in plan of care at this time. Thank you for this referral.  Aruna Florian OTR/L  Time Calculation: 20 mins      Eval Complexity: History: MEDIUM Complexity : Expanded review of history including physical, cognitive and psychosocial  history ;    Examination: LOW Complexity : 1-3 performance deficits relating to physical, cognitive , or psychosocial skils that result in activity limitations and / or participation restrictions ;    Decision Making:LOW Complexity : No comorbidities that affect functional and no verbal or physical assistance needed to complete eval tasks

## 2019-03-05 NOTE — PROGRESS NOTES
0830 - Patient in chair at this time. A/O x 3. IV to left arm  intact and patent. Teds bilateral.  Mepilex dressing to left knee CDI. Denies numbness/tingling. Pedal pulses palpable. Lungs clear. Bowel sounds present to all quadrants. Patient able to get to 1000 on the incentive spirometer. Pain 0/10.     11:29-Pain 2/10. PRN Roxicodone 5 mg pain medication administered at this time. Patient anticipating PT session. Patient has been educated on side effects.

## 2019-03-05 NOTE — ROUTINE PROCESS
Dual AVS reviewed with Bessie Spatz, RN. All medications reviewed individually with patient. Opportunities for questions and concerns provided. Patient verbalized understanding and verified by teachback. IV discontinued, no redness, swelling or pain noted. Patient discharged via (mode of transport ie. Car, ambulance or air transport) car. Patient's arm band appropriately discarded.

## 2019-03-05 NOTE — HOME CARE
Demographics confirmed and VIP card left. Pt and Family questions answered.  Called to intake Yung Doyle RN  [address] [facility] [phone]

## 2019-03-05 NOTE — PROGRESS NOTES
1940 - Received report from Solitario Dawkins RN(offgoing nurse). Assumed care of PT. PT assessed. PT oriented to room & given instructions regarding call bell, incentive spirometer, room phone, medications, and pain medications with potential side effects. PT encouraged to use call bell. Phone & call bell left in reach of PT. 18 G(size) IV left forearm (location) present. Mepilex AG dressing present on left knee. 2217 - Patient did not bring Verapamil ER. Patient has history of hypertension and recent BP's of 151/87 & 152/89. Spoke to [employer], [place] and received telephone orders to \"Talk to pharmacy & check if they carry Verapamil. If they don't then a dose equivalent calcium channel blocker is okay. \"    2219 - Paged Pharmacy regarding [employer] [place] order. Will receive call back from pharmacy. 2234 - Spoke to pharmacist Frank Lamb). One time dose of Verapamil ER 120mg recommended by pharmacy & entered. 0720 - Bedside and Verbal shift change report given to Carolyn Casanova RN (oncoming nurse) by Nasima Barker RN (offgoing nurse) successfully. Report included the following information SBAR, Kardex, Procedure Summary, Intake/Output, MAR, Accordion, Recent Results and Med Rec Status.

## 2019-03-05 NOTE — ROUTINE PROCESS
- Bedside report received from [facility]. Patient in bed at this time. Plan of care for the day addressed with the patient. Care assumed at this time.

## 2019-03-05 NOTE — PROGRESS NOTES
Problem: Falls - Risk of  Goal: *Absence of Falls  Document Steven Fall Risk and appropriate interventions in the flowsheet.   Outcome: Progressing Towards Goal  Fall Risk Interventions:  Mobility Interventions: Communicate number of staff needed for ambulation/transfer, OT consult for ADLs, Patient to call before getting OOB, PT Consult for mobility concerns, PT Consult for assist device competence, Utilize walker, cane, or other assistive device, Strengthening exercises (ROM-active/passive)    Mentation Interventions: Adequate sleep, hydration, pain control    Medication Interventions: Teach patient to arise slowly, Patient to call before getting OOB    Elimination Interventions: Call light in reach, Patient to call for help with toileting needs, Toileting schedule/hourly rounds

## 2019-03-05 NOTE — PROGRESS NOTES
Transition of Care (JASMEET) Plan:    Chart reviewed, met with pt in room. Pt planning discharge home with family to assist. [facility] offered, pt chose [facility] [phone] for follow up; referral placed with CMS. Pt has RW for home. JASMEET Transportation:   How is patient being transported at discharge? Family/Friend      When? Once cleared by Therapy between 12-2pm     Is transport scheduled? N/A      Follow-up appointment and transportation:   PCP/Specialist?  See AVS for Appointment         Who is transporting to the follow-up appointment? Family/Friend      Is transport for follow up appointment scheduled? N/A    Communication plan (with patient/family): Who is being called? Patient or Next of Kin? Responsible party? Patient      What number(s) is to be used? See Facesheet      What service provider is calling for [facility] services? When are they calling? 24-48 hours following discharge    Readmission Risk? (Green/Low; Yellow/Moderate; Red/High):  Green    Care Management Interventions  PCP Verified by CM:  Yes  Transition of Care Consult (CM Consult): [address]: Yes  Discharge Durable Medical Equipment: No  Physical Therapy Consult: Yes  Occupational Therapy Consult: Yes  Current Support Network: Lives with Spouse, Own Home  Confirm Follow Up Transport: Family  Plan discussed with Pt/Family/Caregiver: Yes  Freedom of Choice Offered: Yes  Discharge Location  Discharge Placement: Home with home health

## 2019-03-06 ENCOUNTER — HOME CARE VISIT (OUTPATIENT)
Dept: SCHEDULING | Facility: HOME HEALTH | Age: 74
End: 2019-03-06
Payer: COMMERCIAL

## 2019-03-06 VITALS — OXYGEN SATURATION: 95 % | HEART RATE: 94 BPM | TEMPERATURE: 97.7 F

## 2019-03-06 VITALS
HEART RATE: 90 BPM | SYSTOLIC BLOOD PRESSURE: 162 MMHG | DIASTOLIC BLOOD PRESSURE: 92 MMHG | TEMPERATURE: 98 F | OXYGEN SATURATION: 95 %

## 2019-03-06 PROCEDURE — 400013 HH SOC

## 2019-03-06 PROCEDURE — A6213 FOAM DRG >16<=48 SQ IN W/BDR: HCPCS

## 2019-03-06 PROCEDURE — G0151 HHCP-SERV OF PT,EA 15 MIN: HCPCS

## 2019-03-06 PROCEDURE — G0299 HHS/HOSPICE OF RN EA 15 MIN: HCPCS

## 2019-03-06 NOTE — PROGRESS NOTES
Problem: Mobility Impaired (Adult and Pediatric)  Goal: *Acute Goals and Plan of Care (Insert Text)  Goals to be addressed in 1-4 days:  STG  1. Rolling L to R to L modified independent for positioning. 2. Supine to sit to supine S with HR for meals. 3. Sit to stand to sit S with RW in prep for ambulation. LTG  1. Ambulate >150ft S with RW, WBAT, for home/community mobility. 2. Ascend/descend a >3 stair steps CGA with HR for home entry. 3. Tolerate up in chair 1-2 hours for ADLs. 4. Patient/family to be independent with HEP for follow-up care and safe discharge. Outcome: Resolved/Met Date Met: 03/05/19  physical Therapy TREATMENT/DISCHARGE    Patient: Briseyda Coates (63 y.o. female)  Date: 3/5/2019  Diagnosis: Osteoarthritis of left knee [M17.12] Osteoarthritis of left knee  Procedure(s) (LRB):  LEFT TOTAL KNEE REPLACEMENT (Left) 1 Day Post-Op  Precautions: Fall, WBAT  Chart, physical therapy assessment, plan of care and goals were reviewed. ASSESSMENT:  Pt found seated in chair on PT arrival and w/o c/o pain. At completion of session, pt demonstrates supervision level performance, including bed mobility, transfers, and gt with RW/WBAT L LE/180ft with reciprocal gt pattern and decreased step clearance. Pt educated in stair nego and returned performance accurately with CGA using bilat and L HR ascending. Pt ready to progress to next level of care with HHPT. Progression toward goals:  [x]      Goals met  []      Improving appropriately and progressing toward goals  []      Improving slowly and progressing toward goals  []      Not making progress toward goals and plan of care will be adjusted     PLAN:  Patient will be discharged from physical therapy at this time.   Rationale for discharge:  [x] Goals Achieved  [] [address]  [] Patient not participating in therapy  [] Other:  Discharge Recommendations:  Home Health  Further Equipment Recommendations for Discharge:  N/A     SUBJECTIVE:   Patient stated You're here.     OBJECTIVE DATA SUMMARY:   Critical Behavior:  Neurologic State: Alert, Appropriate for age  Orientation Level: Oriented X4  Cognition: Appropriate decision making, Appropriate for age attention/concentration, Appropriate safety awareness, Follows commands  Safety/Judgement: Awareness of environment, Fall prevention, Good awareness of safety precautions, Home safety  Functional Mobility Training:  Bed Mobility:  Supine to Sit: Supervision  Sit to Supine: Supervision  Transfers:  Sit to Stand: Supervision; Other (comment)(vc)  Stand to Sit: Supervision; Other (comment)(vc)  Balance:  Sitting: Intact  Standing: Intact  Ambulation/Gait Training:  Distance (ft): 180 Feet (ft)  Assistive Device: Gait belt;Walker, rolling  Ambulation - Level of Assistance: Supervision  Gait Abnormalities: Decreased step clearance  Left Side Weight Bearing: As tolerated  Stance: Left decreased  Speed/Catherine: Pace decreased (<100 feet/min)  Step Length: Left shortened;Right shortened  Interventions: Safety awareness training; Tactile cues; Verbal cues; Visual/Demos  Stairs:  Number of Stairs Trained: 5  Stairs - Level of Assistance: Contact guard assistance   Rail Use: Both(and L ascending/R descending)  Therapeutic Exercises:   Demonstrate understanding of HEP per protocol  Pain:  Pain Scale 1: Numeric (0 - 10)  Pain Intensity 1: 0  Pain Location 1: Knee  Pain Orientation 1: Left  Pain Description 1: Tightness  Pain Intervention(s) 1: Medication (see MAR)  Activity Tolerance:   Good   Please refer to the flowsheet for vital signs taken during this treatment.   After treatment:   [x] Patient left in no apparent distress sitting up in chair  [] Patient left in no apparent distress in bed  [x] Call bell left within reach  [x] Nursing notified  [x] Caregiver present  [] Bed alarm activated  Ayden Carlin PT   Time Calculation: 17 mins

## 2019-03-07 ENCOUNTER — HOME CARE VISIT (OUTPATIENT)
Dept: SCHEDULING | Facility: HOME HEALTH | Age: 74
End: 2019-03-07
Payer: COMMERCIAL

## 2019-03-07 VITALS
OXYGEN SATURATION: 95 % | TEMPERATURE: 97.1 F | SYSTOLIC BLOOD PRESSURE: 114 MMHG | DIASTOLIC BLOOD PRESSURE: 76 MMHG | HEART RATE: 118 BPM

## 2019-03-07 PROCEDURE — G0157 HHC PT ASSISTANT EA 15: HCPCS

## 2019-03-08 ENCOUNTER — HOME CARE VISIT (OUTPATIENT)
Dept: SCHEDULING | Facility: HOME HEALTH | Age: 74
End: 2019-03-08
Payer: COMMERCIAL

## 2019-03-08 VITALS
SYSTOLIC BLOOD PRESSURE: 132 MMHG | TEMPERATURE: 97.3 F | DIASTOLIC BLOOD PRESSURE: 76 MMHG | OXYGEN SATURATION: 96 % | HEART RATE: 105 BPM

## 2019-03-08 PROCEDURE — G0157 HHC PT ASSISTANT EA 15: HCPCS

## 2019-03-08 PROCEDURE — G0299 HHS/HOSPICE OF RN EA 15 MIN: HCPCS

## 2019-03-11 ENCOUNTER — HOME CARE VISIT (OUTPATIENT)
Dept: SCHEDULING | Facility: HOME HEALTH | Age: 74
End: 2019-03-11
Payer: COMMERCIAL

## 2019-03-11 VITALS
OXYGEN SATURATION: 97 % | SYSTOLIC BLOOD PRESSURE: 122 MMHG | DIASTOLIC BLOOD PRESSURE: 70 MMHG | TEMPERATURE: 97.3 F | HEART RATE: 117 BPM

## 2019-03-11 PROCEDURE — G0157 HHC PT ASSISTANT EA 15: HCPCS

## 2019-03-13 ENCOUNTER — HOME CARE VISIT (OUTPATIENT)
Dept: SCHEDULING | Facility: HOME HEALTH | Age: 74
End: 2019-03-13
Payer: COMMERCIAL

## 2019-03-13 ENCOUNTER — HOME CARE VISIT (OUTPATIENT)
Dept: HOME HEALTH SERVICES | Facility: HOME HEALTH | Age: 74
End: 2019-03-13
Payer: COMMERCIAL

## 2019-03-13 VITALS
HEART RATE: 97 BPM | DIASTOLIC BLOOD PRESSURE: 82 MMHG | TEMPERATURE: 97.9 F | OXYGEN SATURATION: 97 % | SYSTOLIC BLOOD PRESSURE: 128 MMHG

## 2019-03-13 PROCEDURE — G0157 HHC PT ASSISTANT EA 15: HCPCS

## 2019-03-13 PROCEDURE — G0299 HHS/HOSPICE OF RN EA 15 MIN: HCPCS

## 2019-03-14 VITALS
OXYGEN SATURATION: 97 % | SYSTOLIC BLOOD PRESSURE: 130 MMHG | TEMPERATURE: 97.9 F | HEART RATE: 99 BPM | DIASTOLIC BLOOD PRESSURE: 84 MMHG

## 2019-03-15 ENCOUNTER — HOME CARE VISIT (OUTPATIENT)
Dept: SCHEDULING | Facility: HOME HEALTH | Age: 74
End: 2019-03-15
Payer: COMMERCIAL

## 2019-03-15 PROCEDURE — G0157 HHC PT ASSISTANT EA 15: HCPCS

## 2019-03-17 VITALS
SYSTOLIC BLOOD PRESSURE: 150 MMHG | OXYGEN SATURATION: 97 % | DIASTOLIC BLOOD PRESSURE: 82 MMHG | TEMPERATURE: 97.7 F | HEART RATE: 103 BPM

## 2019-03-18 ENCOUNTER — HOME CARE VISIT (OUTPATIENT)
Dept: SCHEDULING | Facility: HOME HEALTH | Age: 74
End: 2019-03-18
Payer: COMMERCIAL

## 2019-03-18 VITALS
TEMPERATURE: 98.2 F | OXYGEN SATURATION: 97 % | DIASTOLIC BLOOD PRESSURE: 63 MMHG | SYSTOLIC BLOOD PRESSURE: 121 MMHG | HEART RATE: 81 BPM

## 2019-03-18 VITALS
RESPIRATION RATE: 12 BRPM | DIASTOLIC BLOOD PRESSURE: 90 MMHG | OXYGEN SATURATION: 98 % | SYSTOLIC BLOOD PRESSURE: 136 MMHG | TEMPERATURE: 98 F | HEART RATE: 85 BPM

## 2019-03-18 PROCEDURE — G0157 HHC PT ASSISTANT EA 15: HCPCS

## 2019-03-18 PROCEDURE — G0299 HHS/HOSPICE OF RN EA 15 MIN: HCPCS

## 2019-03-20 ENCOUNTER — HOME CARE VISIT (OUTPATIENT)
Dept: SCHEDULING | Facility: HOME HEALTH | Age: 74
End: 2019-03-20
Payer: COMMERCIAL

## 2019-03-20 PROCEDURE — G0151 HHCP-SERV OF PT,EA 15 MIN: HCPCS

## 2019-03-21 VITALS
SYSTOLIC BLOOD PRESSURE: 142 MMHG | OXYGEN SATURATION: 98 % | HEART RATE: 81 BPM | TEMPERATURE: 98.1 F | DIASTOLIC BLOOD PRESSURE: 97 MMHG

## 2019-03-22 ENCOUNTER — HOSPITAL ENCOUNTER (OUTPATIENT)
Dept: PHYSICAL THERAPY | Age: 74
Discharge: HOME | End: 2019-03-22
Payer: COMMERCIAL

## 2019-03-22 PROCEDURE — 97110 THERAPEUTIC EXERCISES: CPT

## 2019-03-22 PROCEDURE — 97162 PT EVAL MOD COMPLEX 30 MIN: CPT

## 2019-03-22 PROCEDURE — 97530 THERAPEUTIC ACTIVITIES: CPT

## 2019-03-22 PROCEDURE — 97140 MANUAL THERAPY 1/> REGIONS: CPT

## 2019-03-22 NOTE — PROGRESS NOTES
PHYSICAL THERAPY - DAILY TREATMENT NOTE Patient Name: Luther Singh        Date: 3/22/2019 : 1945   YES Patient  Verified Visit #:   1     Insurance: Payor: Manuela Ewing / Plan: [address] PT / Product Type: Commerical / In time: 8:35 Out time: 9:30 Total Treatment Time: 54 Medicare/BCBS Time Tracking (below) Total Timed Codes (min):  40 1:1 Treatment Time:  40 TREATMENT AREA =  L Knee SUBJECTIVE Pain Level (on 0 to 10 scale): 0  / 10 Medication Changes/New allergies or changes in medical history, any new surgeries or procedures? YES    If yes, update Summary List  
Subjective Functional Status/Changes:  []  No changes reported History of Condition:  L TKA on  secondary to history of OA Aggravating Factors: Stiffness secondary to staying sedentary Alleviating Factors: Movement, ice and heat Previous Treatment: Corticosteroid injections PMHx:see chart Social/Recreational/Work: RN, [employer] activities, walking Pt Goals: Walking dog, attending [employer] FOTO: 51 OBJECTIVEPhysical Therapy Evaluation - Knee Gait:  [] Normal    [] Abnormal    [] Antalgic    [] NWB    Device:  
  Describe:  
 
ROM / Strength 
[] Unable to assess                  AROM                      PROM                   Strength (1-5) Left Right Left Right Left Right Hip Flexion     4 4 Extension     Not tested Not tested ER     4- 4-  
 IR     3+ 4-  
Knee Flexion 105 144 115  4- 4- Extension -115 7 -105  4- 5 Ankle Plantarflexion     4 4 Dorsiflexion     5 5 Flexibility: [] Unable to assess at this time Hamstrings: (L) Tightness= [] WNL   [] Min   [] Mod   [] Severe (R) Tightness= [] WNL   [] Min   [] Mod   [] Severe Quadriceps: (L) Tightness= [] WNL   [] Min   [] Mod   [] Severe (R) Tightness= [] WNL   [] Min   [] Mod   [] Severe Gastroc: (L) Tightness= [] WNL   [] Min   [] Mod   [] Severe (R) Tightness= [] WNL   [] Min   [] Mod   [] Severe Other: 
 
Palpation: 
 Neg/Pos  Neg/Pos  Neg/Pos Joint Line  Quad tendon  Patellar ligament Patella  Fibular head  Pes Anserinus Tibial tubercle  Hamstring tendons  Infrapatellar fat pad Optional Tests:  
Patellar Positioning (Static) []L []R Normal []L []R Lateral 
 []L []R Loco Belt      []L []R Medial 
 []L []R [facility] Patellar Tracking 
 []L []R Glide (Lat)   []L []R Tilt (Lat) []L []R Glide (Med)  []L []R Tilt (Med) 
    []L []R Tile (Inf) Patellar Mobility 
 []L []R Hypermobile []L []R Hypomobile Girth Measurements in cm:  
   4 in above midpatella   2 in above midpatella  at  midpatella  2 in below midpatella   4 in below midpatella Left Right Lachmans  [] Neg    [] Pos Posterior Drawer [] Neg    [] Pos Pivot Shift  [] Neg    [] Pos Posterior Sag  [] Neg    [] Pos Bigg's Test [] Neg    [] Pos Duglas's Test  [] Neg    [] Pos 
Squat   [] Neg    [] Pos          Ely's Test             [] Neg    [] Pos 
Valgus@ 0 Degrees [] Neg    [] Pos Kristen-Travis [] Neg    [] Pos 
Valgus@ 30 Degrees [] Neg    [] Pos Patellar Apprehension[] Neg    [] Pos 
Varus@ 0 Degrees [] Neg    [] Pos Apley's Compression [] Neg    [] Pos 
Varus@ 30 Degrees [] Neg    [] Pos Apley's Distraction [] Neg    [] Pos Anterior Drawer [] Neg    [] Pos Other:                  [] Neg    [] Pos 
            
Modalities Rationale: To improve activity tolerance for exercise performance and ADL's.    
 min [] Estim, type/location:   
                                 []  att     []  unatt     []  w/US     []  w/ice    []  w/heat 
 min []  Mechanical Traction: type/lbs   
               []  pro   []  sup   []  int   []  cont    []  before manual    []  after manual  
 min []  Ultrasound, settings/location:    
 min []  Iontophoresis w/ dexamethasone, location:   
                                           []  take home patch       []  in clinic min []  Ice     []  Heat    location/position:   
 min []  Vasopneumatic Device, press/temp:   
 min []  Other:   
[] Skin assessment post-treatment (if applicable):   
[x]  intact    []  redness- no adverse reaction    
[]redness  adverse reaction:   
 
15 min Therapeutic Exercise:  [x]  See flow sheet Rationale:      increase ROM and increase strength to improve the patients ability to perform ADL\"s with improved quadriceps stability. 15 min Therapeutic Activity: Stair negotiation, gait assessment and training 8 x 30 ft  
Rationale: To increase safety and efficiency with  ADL's. 10 min Manual Therapy: PROM knee flexion and extension, grade 3-4 extension mobilization Rationale:      decrease pain, increase ROM, increase tissue extensibility and decrease edema  to improve patient's ability to ambulate with improved step length. 
 
 min Gait Training:   
Rationale:     min Patient Education:  YES  Reviewed HEP []  Progressed/Changed HEP based on:   HEP issued Other Objective/Functional Measures: 
 
See above Post Treatment Pain Level (on 0 to 10) scale:   0  / 10 ASSESSMENT Assessment/Changes in Function:  
 
Justification for Eval Code Complexity: MODERATE Patient History (low 0, mod 1-2, high 3-4): HTN, Left foot ORIF 2015 MODERATE Examination (low 1-2, mod 3+, high 4+): LE AROM, LE MMT, gait analysis, transfer analysis, LE girth measurements HIGH Clinical Presentation (low: stable/uncomplicated; mod: evolving; high: unstable/unpredictable): evolving AROM with exercise and PROM MODERATE Clinical Decision Making (low , mod 26-74, high 1-25): 51 MODERATE 
  
[]  See Progress Note/Recertification Patient will continue to benefit from skilled PT services to modify and progress therapeutic interventions, address functional mobility deficits, address ROM deficits, address strength deficits, analyze and address soft tissue restrictions, analyze and cue movement patterns, analyze and modify body mechanics/ergonomics and assess and modify postural abnormalities to attain remaining goals. Progress toward goals / Updated goals: 
Goals established, See PoC PLAN [x]  Upgrade activities as tolerated YES Continue plan of care  
[]  Discharge due to :   
[x]  Other: Initiate PoC Therapist: Alvarado Adames Date: 3/22/2019 Time: 8:41 AM

## 2019-03-22 NOTE — PROGRESS NOTES
Nanda Pierre [address]  [facility] PHYSICAL THERAPY 
[address], [place], [address] - Phone: [phone]  Fax: [phone] PLAN OF CARE / STATEMENT OF MEDICAL NECESSITY FOR PHYSICAL THERAPY SERVICES Patient Name: Ciaran Dawson : 1945 Medical  
Diagnosis: Pain in left knee [M25.562] Postoperative pain of left knee [G89.18, M25.562] Treatment Diagnosis: Left TKA, 
Grade one post-op lymphedema Onset Date: 3/5/19 DOS Referral Source: Josy Valdivia MD [facility], [place]): 3/22/2019 Prior Hospitalization: See medical history Provider #: [id number] Prior Level of Function: Independent with ADL's, 30' walks daily Comorbidities: HTN, Left foot ORIF  Medications: Verified on Patient Summary List  
The Plan of Care and following information is based on the information from the initial evaluation.  
=========================================================================================== Assessment / key information:  Ciaran Dawson is a 74 y.o.  female with Dx: Pain in left knee [M25.562] Postoperative pain of left knee [G89.18, M25.562], signs and symptoms consistent with grade one post-op lymphedema and L mechanical knee dysfunctional following TKA. The pt presents to the clinic reporting stiffness associated with prolonged immobility and pulling across the top of the knee as her biggest symptoms. Objective: The pt demonstrates the following functional deficits: 1) moderate knee extension AROM/PROM deficits (see chart below). 2) knee flexion AROM/PROM deficits, 3) poor stability with sit<>stand transfers (without UE assist), 4) hip girdle and quadriceps MMT deficits, 5) antalgic gait due to poor TKE in WB, and 6) need of UE assist x 2 to ascend stairs in a reciprocal pattern. ROM / Strength 
[] Unable to assess                  AROM                      PROM                   Strength (1-5)     Left Right Left Right Left Right Hip Flexion         4 4   Extension         Not tested Not tested  
  ER         4- 4-  
  IR         3+ 4-  
Knee Flexion 105 144 115   4- 4-  
  Extension 15 -7 10   4- 5 Ankle Plantarflexion         4 4  
  Dorsiflexion         5 5 The patient's FOTO score of 51 points indicates 49% limitation in functional ability. The patient would benefit from skilled PT to address the below listed impairments. Thank you for your referral.=========================================================================================== Eval Complexity: History: MEDIUM  Complexity : 1-2 comorbidities / personal factors will impact the outcome/ POC Exam:HIGH Complexity : 4+ Standardized tests and measures addressing body structure, function, activity limitation and / or participation in recreation  Presentation: MEDIUM Complexity : Evolving with changing characteristics  Clinical Decision Making:MEDIUM Complexity : FOTO score of 26-74Overall Complexity:MEDIUM Problem List: pain affecting function, decrease ROM, decrease strength, impaired gait/ balance, decrease ADL/ functional abilitiies, decrease activity tolerance, decrease flexibility/ joint mobility and decrease transfer abilities Treatment Plan may include any combination of the following: Therapeutic exercise, Therapeutic activities, Neuromuscular re-education, Physical agent/modality, Gait/balance training, Manual therapy, Aquatic therapy, Patient education, Self Care training, Functional mobility training, Home safety training and Stair training Treatment Protocol for Lymphedema (if clinically indicated):  
 o Manual Lymphatic Drainage 
 o Soft Tissue Mobilization/MFR 
 o Compression Bandaging 
 o Prescription of Pneumatic Compression Pump 
 o Decongestive Exercises/Self MFR/Strengthening 
 o Education 
 o Compression [employer] Patient / Family readiness to learn indicated by: asking questions, trying to perform skills and interestPersons(s) to be included in education: patient (P) Barriers to Learning/Limitations: None Measures taken: na  
Patient Goal (s): \"I'd like to get back to walking my dog every day again. \"  
Patient self reported health status: good Rehabilitation Potential: good ? Short Term Goals: To be accomplished in  2-3  weeks: 1. Patient will demonstrate compliance with HEP for symptom management at home. 2. Patient will ascend and descend 12 stairs in a reciprocal pattern, 1 UE assist to improve efficiency with community navigation. 3. Patient will demonstrates 10 degrees extension AROM to improve efficiency with WB ADL's. 
? Long Term Goals: To be accomplished in  4-6  weeks: 1. Patient will be independent with HEP to self-manage and prevent symptoms upon DC. 2.  Patient will improve FOTO score to at least 63 points to indicate improved functional status. 3.  Patient will demonstrate at last 5/5 quadriceps MMT bilaterally to improve stability in gait. 4.  Patient will demonstrate at least 120 degrees flexion AROM to improve efficiency with stair negotiation. 5. Patient will demonstrate at least 5 degrees extension AROM to improve step length in gait. Frequency / Duration:   Patient to be seen  2-3  times per week for 4-6  weeks: 
Patient / Caregiver education and instruction: self care, activity modification and exercises Therapist Signature: Abraham Murray DPT Date: 3/22/2019 Certification Period: 3/22/19 - 6/21/19 Time: 11:54 AM  
=========================================================================================== I certify that the above Physical Therapy Services are being furnished while the patient is under my care. I agree with the treatment plan and certify that this therapy is necessary. Physician Signature:       Date:      Time:  Please sign and return to [facility] [employer] or you may fax the signed copy to [phone]. Thank you.

## 2019-03-25 ENCOUNTER — HOSPITAL ENCOUNTER (OUTPATIENT)
Dept: PHYSICAL THERAPY | Age: 74
Discharge: HOME | End: 2019-03-25
Payer: COMMERCIAL

## 2019-03-25 PROCEDURE — 97140 MANUAL THERAPY 1/> REGIONS: CPT

## 2019-03-25 PROCEDURE — 97110 THERAPEUTIC EXERCISES: CPT

## 2019-03-25 PROCEDURE — 97530 THERAPEUTIC ACTIVITIES: CPT

## 2019-03-25 NOTE — PROGRESS NOTES
PHYSICAL THERAPY - DAILY TREATMENT NOTE Patient Name: Briseyda Coates        Date: 3/25/2019 : 1945   YES Patient  Verified Visit #:   2     Insurance: Payor: Jamal Swift / [facility] [address] / Product Type: Commerical / In time: 11:30 Out time: 12:45 Total Treatment Time: 75 Medicare/[facility] Time Tracking (below) Total Timed Codes (min): 55 1:1 Treatment Time:  72 TREATMENT AREA =  Pain in left knee [M25.562] Postoperative pain of left knee [G89.18, M25.562] SUBJECTIVE Pain Level (on 0 to 10 scale):  1  / 10 Medication Changes/New allergies or changes in medical history, any new surgeries or procedures? NO     If yes, update Summary List  
Subjective Functional Status/Changes:  []  No changes reported \"I've doing the prop-up exercises. They're not too bad. \" OBJECTIVE Modalities Rationale: To improve activity tolerance for exercise performance and ADL's.  
 min [] Estim, type/location:   
                                 []  att     []  unatt     []  w/US     []  w/ice    []  w/heat 
 min []  Mechanical Traction: type/lbs   
               []  pro   []  sup   []  int   []  cont    []  before manual    []  after manual  
 min []  Ultrasound, settings/location:    
 min []  Iontophoresis w/ dexamethasone, location:   
                                           []  take home patch       []  in clinic  
10 min [x]  Ice     []  Heat    location/position: Supine, LE extended with no heel prop  
 min []  Vasopneumatic Device, press/temp:   
 min []  Other:   
[x] Skin assessment post-treatment (if applicable):  
[x]  intact    []  redness- no adverse reaction    
[]redness  adverse reaction: 
 
25 min Therapeutic Exercise:  [x]  See flow sheet Rationale:      increase ROM and increase strength to improve the patients ability to perform ADL's with improved quadriceps MMT and dynamic stability. 15 min Therapeutic Activity: Retroambulation, stair negotiation Rationale: To increase safety and efficiency with ADL's. 15 min Manual Therapy: LE girth measurements, grade 4 terminal femoral mob's, contract-relax to the hamstrings Rationale:      decrease pain, increase ROM and increase tissue extensibility to improve patient's ability to ambulate with improved step length. 10 min Patient Education:  YES  Reviewed HEP, fitting for compression garmen  
[]  Progressed/Changed HEP based on:   Patient reports compliance Other Objective/Functional Measures: 
 
Pt demonstrates increase in girth by 2 cm at several landmarks therefore indicating need for a compression garment to manage LE edema. Pt demonstrates knee flexion 115 degrees AAROM with minimal symptoms. Pt demonstrates 9 degrees knee extension AAROM with a foam roll under the ankle and 5 degrees PROM knee extension. Pt requires cues with step-up's to perform without excessive UE assist and hopping due to poor quadriceps control. Pt also demonstrates 2 cm increase in true length length on the right > left. Issued pt a heel lift to assess effect on gait. Pt wore sling-back shoes; therefore, will assess in tennishoes next visit. Post Treatment Pain Level (on 0 to 10) scale:   2  / 10 ASSESSMENT Assessment/Changes in Function: WIll continue to progress strengthening/AROM per activity tolerance. Will assess effectiveness of AROM with use of a compression garment to decrease LE edema and capsular stiffness. []  See Progress Note/Recertification Patient will continue to benefit from skilled PT services to modify and progress therapeutic interventions, address functional mobility deficits, address ROM deficits, address strength deficits, analyze and address soft tissue restrictions, analyze and cue movement patterns, analyze and modify body mechanics/ergonomics and assess and modify postural abnormalities to attain remaining goals. Progress toward goals / Updated goals: Addressed AROM with retroambulation and manual interventions. PLAN [x]  Upgrade activities as tolerated YES Continue plan of care  
[]  Discharge due to :   
[]  Other:   
 
Therapist: Tasia Dietz Date: 3/25/2019 Time: 3:41 PM  
 
Future Appointments Date Time Provider Iglesia Mera 3/29/2019 11:00 AM [facility]  
4/1/2019  9:00 AM [facility]  
4/3/2019  8:30 AM Nathan Lal [facility]  
4/8/2019  8:30 AM [facility]  
4/11/2019  8:30 AM [facility]  
4/17/2019  8:00 AM Nathan Lal [facility]  
4/22/2019  8:00 AM Nathan Lal [facility]  
4/25/2019  8:00 AM [facility]  
4/29/2019  8:00 AM [facility]

## 2019-03-29 ENCOUNTER — HOSPITAL ENCOUNTER (OUTPATIENT)
Dept: PHYSICAL THERAPY | Age: 74
Discharge: HOME | End: 2019-03-29
Payer: COMMERCIAL

## 2019-03-29 PROCEDURE — 97140 MANUAL THERAPY 1/> REGIONS: CPT

## 2019-03-29 PROCEDURE — 97110 THERAPEUTIC EXERCISES: CPT

## 2019-03-29 NOTE — PROGRESS NOTES
PHYSICAL THERAPY - DAILY TREATMENT NOTE Patient Name: Hugo Goss        Date: 3/29/2019 : 1945   YES Patient  Verified Visit #:   3     Insurance: Payor: Samara Boone / Plan: [facility] [place]  CAPITATED PT / Product Type: Commerical / In time: 10:55 Out time: 11:35 Total Treatment Time: 40 Medicare/[facility] [place] Time Tracking (below) Total Timed Codes (min):  40 1:1 Treatment Time:  40 TREATMENT AREA =  Pain in left knee [M25.562] Postoperative pain of left knee [G89.18, M25.562] SUBJECTIVE Pain Level (on 0 to 10 scale):  2  / 10 Medication Changes/New allergies or changes in medical history, any new surgeries or procedures? NO    If yes, update Summary List  
Subjective Functional Status/Changes:  []  No changes reported Pt states she hasn't gotten her compression garments in yet, still waiting on them to be delivered. Pt reports she has been using the heel lift in her right shoe, states it works best with her tennis shoes and she does feel more stable when she's wearing it. OBJECTIVE 32 min Therapeutic Exercise:  [x]  See flow sheet Rationale:    Increase LE ROM/flexibility, increase strength and increase proprioception to improve the patients ability to perform ADL's and gait safely and I to allow for increased activity tolerance and increased functional mobility. 8 min Manual Therapy: PROM extension with overpressure, lymphatic stimulation technique Rationale: Increase knee ROM and increase tissue extensibility to facilitate normal gait mechanics and increased ease with ADL's.  
  min Patient Education:  YES  Reviewed HEP []  Progressed/Changed HEP based on:   Patient with no questions, continue same HEP Other Objective/Functional Measures: Pt wearing heel lift right shoe today with tennis shoes, demo's improved heel to toe gait mechanics. Added tap ups for strength and single limb balance. Added incline gastroc stretch for ROM. Post Treatment Pain Level (on 0 to 10) scale:   2  / 10 ASSESSMENT Assessment/Changes in Function:  
 
Pt demo's improved gait mechanics with wearing right heel lift. []  See Progress Note/Recertification Patient will continue to benefit from skilled PT services to modify and progress therapeutic interventions, address functional mobility deficits, address ROM deficits, address strength deficits, analyze and address soft tissue restrictions, analyze and cue movement patterns, analyze and modify body mechanics/ergonomics and assess and modify postural abnormalities to attain remaining goals. Progress toward goals / Updated goals: 1. Patient will demonstrate compliance with HEP for symptom management at home. Pt reporting compliance with current HEP 2. Patient will ascend and descend 12 stairs in a reciprocal pattern, 1 UE assist to improve efficiency with community navigation. step ups for strengthening 3. Patient will demonstrates 10 degrees extension AROM to improve efficiency with WB ADL's. TKE in standing for AROM extension and incline gastroc stretch for ROM  
PLAN 
 
[]  Upgrade activities as tolerated YES Continue plan of care  
[]  Discharge due to :   
[]  Other:   
 
Therapist: Jean Ashford PTA Date: 3/29/2019 Time: 10:55 AM  
 
Future Appointments Date Time Provider Iglesia Mera 3/29/2019 11:00 AM [facility]  
4/1/2019  9:00 AM [facility]  
4/3/2019  8:30 AM Antonino Sarkar [facility]  
4/8/2019  8:30 AM [facility]  
4/11/2019  8:30 AM [facility]  
4/17/2019  8:00 AM Antonino Sarkar PT [facility]  
4/22/2019  8:00 AM Antonino Sarkar PT [facility]  
4/25/2019  8:00 AM [facility]  
4/29/2019  8:00 AM [facility]

## 2019-04-01 ENCOUNTER — HOSPITAL ENCOUNTER (OUTPATIENT)
Dept: PHYSICAL THERAPY | Age: 74
Discharge: HOME | End: 2019-04-01
Payer: COMMERCIAL

## 2019-04-01 PROCEDURE — 97140 MANUAL THERAPY 1/> REGIONS: CPT

## 2019-04-01 PROCEDURE — 97110 THERAPEUTIC EXERCISES: CPT

## 2019-04-01 NOTE — PROGRESS NOTES
PHYSICAL THERAPY - DAILY TREATMENT NOTE Patient Name: Franca Allen        Date: 2019 : 1945   YES Patient  Verified Visit #:   4     Insurance: Payor: Kristina Barker / [place]: [place] [employer]  CAPITATED PT / Product Type: Commerical / In time: 9:00 Out time: 9:40 Total Treatment Time: 40 Medicare/BCBS [place] Time Tracking (below) Total Timed Codes (min):  40 1:1 Treatment Time:  40 TREATMENT AREA =  Pain in left knee [M25.562] Postoperative pain of left knee [G89.18, M25.562] SUBJECTIVE Pain Level (on 0 to 10 scale):  1  / 10 Medication Changes/New allergies or changes in medical history, any new surgeries or procedures? NO    If yes, update Summary List  
Subjective Functional Status/Changes:  []  No changes reported Pt c/o left knee pain during the night. Pt states this afternoon she will start back at work , going back part time (about 4 hours) for the first 2 weeks. Pt states she stands and sits, but is able to take rests when needed. Pt c/o feeling like her heel lift is not thick enough. Pt reports received her compression garment on Friday and has been wearing it daily since then, pt states \"the compression feels good. \" [id number] min Therapeutic Exercise:  [x]  See flow sheet Rationale:    Increase LE ROM/flexibility, increase strength and increase proprioception to improve the patients ability to perform ADL's and gait safely and I to allow for increased activity tolerance and increased functional mobility. 8 min Manual Therapy: PROM knee extension with overpressure, manual HS stretch left LE with pt supine Rationale: Increase knee ROM and increase tissue extensibility to facilitate normal gait mechanics and increased ease with ADL's.  
  min Patient Education:  YES  Reviewed HEP []  Progressed/Changed HEP based on:   Discussed with pt alternating between sitting and standing work activity and reporting her activity tolerance. Advised pt to put on compression garment first thing in the morning and take off before going to bed. Other Objective/Functional Measures: Added SLR flexion and bridges for strength. Post Treatment Pain Level (on 0 to 10) scale:   1  / 10 ASSESSMENT Assessment/Changes in Function: Pt with good tolerance to progression of strength ex.   
[]  See Progress Note/Recertification Patient will continue to benefit from skilled PT services to  modify and progress therapeutic interventions, address functional mobility deficits, address ROM deficits, address strength deficits, analyze and address soft tissue restrictions, analyze and cue movement patterns, analyze and modify body mechanics/ergonomics and assess and modify postural abnormalities to attain remaining goals. Haylee Michaels Progress toward goals / Updated goals: 1. Patient will demonstrate compliance with HEP for symptom management at home. Pt reporting compliance with HEP 2. Patient will ascend and descend 12 stairs in a reciprocal pattern, 1 UE assist to improve efficiency with community navigation. step ups for strengthening 3. Patient will demonstrates 10 degrees extension AROM to improve efficiency with WB ADL's. manual to address soft tissue restriction PLAN 
 
[]  Upgrade activities as tolerated YES Continue plan of care  
[]  Discharge due to :   
[]  Other:   
 
Therapist: Yarelis Briscoe PTA Date: 4/1/2019 Time: 9:09 AM  
 
Future Appointments Date Time Provider Iglesia Mera 4/3/2019  8:30 AM Linn Dc [facility]  
4/8/2019  8:30 AM [facility]  
4/11/2019  8:30 AM [facility]  
4/17/2019  8:00 AM Linn Dc PT [facility]  
4/22/2019  8:00 AM Linn Dc PT [facility]  
4/25/2019  8:00 AM [facility]  
4/29/2019  8:00 AM [facility]

## 2019-04-03 ENCOUNTER — HOSPITAL ENCOUNTER (OUTPATIENT)
Dept: PHYSICAL THERAPY | Age: 74
Discharge: HOME | End: 2019-04-03
Payer: COMMERCIAL

## 2019-04-03 PROCEDURE — 97110 THERAPEUTIC EXERCISES: CPT

## 2019-04-03 PROCEDURE — 97140 MANUAL THERAPY 1/> REGIONS: CPT

## 2019-04-03 NOTE — PROGRESS NOTES
PHYSICAL THERAPY - DAILY TREATMENT NOTE Patient Name: Anette Saucedo        Date: 4/3/2019 : 1945   YES Patient  Verified Visit #:   5     Insurance: Payor: Rylee Ferrer / Plan: [place] [employer]  CAPITATED PT / Product Type: Commerical / In time: 08:30 Out time: 09:12 Total Treatment Time: 42 Medicare/[facility] Time Tracking (below) Total Timed Codes (min):  42 1:1 Treatment Time:  42 TREATMENT AREA = Pain in left knee [M25.562] Postoperative pain of left knee [G89.18, M25.562] SUBJECTIVE Pain Level (on 0 to 10 scale):  1  / 10 Medication Changes/New allergies or changes in medical history, any new surgeries or procedures? NO    If yes, update Summary List  
Subjective Functional Status/Changes:  []  No changes reported \"It is no real pain just tightness\" OBJECTIVE Therapeutic Procedures: 
Min Procedure Specifics + Rationale  
n/a [x]  Patient Education (performed throughout session) [x] Review HEP [] Progressed/Changed HEP based on:  
[] proper performance and advancement of Therex/TA [] reduction in pain level   
[] increased functional capacity [] change in directional preference 30 [x] Therapeutic Exercise [x]  See Flowsheet Rationale: increase ROM and increase strength to improve the patients ability to participate in ADL's   
12 [x]  Manual Therapy 
   
 [] STM/DTM     [] IASTM     [] Scar Massage 
[] cross-friction       [] PNF         [] PROM [] TDN (see objective; actual needle insertion time not billed)  
[] Soft tissue mobz  
[x] Joint mobs: Gr I [x] II [x]  III [] IV[] V[]: 
Sup. Patella glides with quad sets, Grade 1-2 Posterior femoral glides to facilitate extension. Supine hamstring stretches. Knee flexion PROM Rationale: decrease pain, increase ROM, increase tissue extensibility, decrease trigger points and increase postural awareness to attain functional use and participation with ADL's Other Objective/Functional Measures: PT added TKE with ball, quad sets and increased reps/sets/resistance as noted on the flow sheet. Min vcs for proper exercise form. Poor quad contraction note, required max tactile cues for quad contraction. See flow sheet for more details. Progressed therex per flow sheet. Post Treatment Pain Level (on 0 to 10) scale:   1  / 10 ASSESSMENT Assessment/Changes in Function:  
 
Pt reported she had been sleeping with pillow under knee. PT instructed pt on hamstring tightness/contracture and to not place object behind knee. PT encouraged pt to prop knee up on table for 10-30 minutes daily. []  See Plan of Care 
[]  See Progress Note/ Recertification 
[]  See Discharge Summary Patient will continue to benefit from skilled PT services to modify and progress therapeutic interventions, address functional mobility deficits, address ROM deficits, address strength deficits, analyze and address soft tissue restrictions, analyze and cue movement patterns, analyze and modify body mechanics/ergonomics, assess and modify postural abnormalities, address imbalance/dizziness and instruct in home and community integration  to attain remaining goals Progress toward goals / Updated goals: 1. Patient will demonstrate compliance with HEP for symptom management at home. Pt reporting compliance with HEP 2. Patient will ascend and descend 12 stairs in a reciprocal pattern, 1 UE assist to improve efficiency with community navigation. step ups for strengthening  
3. Patient will demonstrates 10 degrees extension AROM to improve efficiency with WB ADL's. manual to address soft tissue restriction PLAN [x]  Upgrade activities as tolerated 
[x]  Update interventions per flow sheet YES Continue plan of care  
[]  Discharge due to :   
[]  Other:   
 
Therapist: Luis Anne DPT Date: 4/3/2019 Time: 7:20 AM  
 
Future Appointments Date Time Provider Iglesia Mera 4/3/2019  8:30 AM Jacquie [facility]  
4/8/2019  8:30 AM [facility]  
4/11/2019  8:30 AM [facility]  
4/17/2019  8:00 AM Jacquie [facility]  
4/22/2019  8:00 AM Jacquie [facility]  
4/25/2019  8:00 AM [facility]  
4/29/2019  8:00 AM [facility]

## 2019-04-08 ENCOUNTER — HOSPITAL ENCOUNTER (OUTPATIENT)
Dept: PHYSICAL THERAPY | Age: 74
Discharge: HOME | End: 2019-04-08
Payer: COMMERCIAL

## 2019-04-08 PROCEDURE — 97110 THERAPEUTIC EXERCISES: CPT

## 2019-04-08 NOTE — PROGRESS NOTES
PHYSICAL THERAPY - DAILY TREATMENT NOTE Patient Name: Sole Done        Date: 2019 : 1945   YES Patient  Verified Visit #:     Insurance: Payor: Pedro Delgadillo / Plan: [employer]  CAPITATED PT / Product Type: Commerical / In time: 8:30 Out time: 9:07 Total Treatment Time: 37 Medicare/[employer]BS [place] Time Tracking (below) Total Timed Codes (min):  37 1:1 Treatment Time:  30 TREATMENT AREA =   Pain in left knee [M25.562] Postoperative pain of left knee [G89.18, M25.562] SUBJECTIVE Pain Level (on 0 to 10 scale):  1  / 10 Medication Changes/New allergies or changes in medical history, any new surgeries or procedures? NO    If yes, update Summary List  
Subjective Functional Status/Changes:  []  No changes reported Pt reports she is part time at work again this weekend and will go back to full time next week. Pt reports swelling in left lower leg much better since she has been wearing the compression sock and states the redness is also going away, states the bruise (left shin) is still there though. OBJECTIVE 
 
37 
(30) min Therapeutic Exercise:  [x]  See flow sheet Rationale:     improve coordination, improve balance and increase proprioception to improve the patients ability to perform ADLs, transfers and gait safely and independently. min Patient Education:  YES  Reviewed HEP [x]  Progressed/Changed HEP based on: Added LE stretches to promote TKE for HEP. Other Objective/Functional Measures: Added 1/2 moon HS stretch and prone knee extension hang for extension AROM. Reviewed form for stagger gastroc stretch for HEP. Post Treatment Pain Level (on 0 to 10) scale:   1  / 10 ASSESSMENT Assessment/Changes in Function: Pt with good tolerance to progression of knee extension ROM ex.  
  
[]  See Progress Note/Recertification Patient will continue to benefit from skilled PT services to modify and progress therapeutic interventions, address functional mobility deficits, address ROM deficits, address strength deficits, analyze and address soft tissue restrictions, analyze and cue movement patterns, analyze and modify body mechanics/ergonomics and assess and modify postural abnormalities to attain remaining goals. Yolanda Richard Progress toward goals / Updated goals: 1. Patient will demonstrate compliance with HEP for symptom management at home. Pt reporting compliance with HEP 2. Patient will ascend and descend 12 stairs in a reciprocal pattern, 1 UE assist to improve efficiency with community navigation.  
3. Patient will demonstrates 10 degrees extension AROM to improve efficiency with WB ADL's. added 1/2 moon HS stretch and prone knee hang for ROM  
PLAN 
 
[]  Upgrade activities as tolerated YES Continue plan of care  
[]  Discharge due to :   
[]  Other:   
 
Therapist: Jean Ashford PTA Date: 4/8/2019 Time: 8:50 AM  
 
Future Appointments Date Time Provider Iglesia Mera 4/11/2019  8:30 AM [facility]  
4/17/2019  8:00 AM Antonino Sarkar PT [facility]  
4/22/2019  8:00 AM Antonino Sarkar PT [facility]  
4/25/2019  8:00 AM [facility]  
4/29/2019  8:00 AM [facility]

## 2019-04-11 ENCOUNTER — HOSPITAL ENCOUNTER (OUTPATIENT)
Dept: PHYSICAL THERAPY | Age: 74
Discharge: HOME | End: 2019-04-11
Payer: COMMERCIAL

## 2019-04-11 PROCEDURE — 97110 THERAPEUTIC EXERCISES: CPT

## 2019-04-11 PROCEDURE — 97140 MANUAL THERAPY 1/> REGIONS: CPT

## 2019-04-11 NOTE — PROGRESS NOTES
PHYSICAL THERAPY - DAILY TREATMENT NOTE Patient Name: Sandralee Canavan        Date: 2019 : 1945   YES Patient  Verified Visit #:     Insurance: Payor: Kiara Merrill / Plan: [place] [employer]  CAPITATED PT / Product Type: Commerical / In time: 8:30 Out time: 9:21 Total Treatment Time: 51 Medicare/BCBS [place] Time Tracking (below) Total Timed Codes (min):  51 1:1 Treatment Time:  40 TREATMENT AREA =  Pain in left knee [M25.562] Postoperative pain of left knee [G89.18, M25.562] SUBJECTIVE Pain Level (on 0 to 10 scale):    / 10 Medication Changes/New allergies or changes in medical history, any new surgeries or procedures? NO    If yes, update Summary List  
Subjective Functional Status/Changes:  []  No changes reported Pt reports increased soreness left knee since PT on Monday, states she has been trying to do the prone hang 2 times a day at home. OBJECTIVE 
 
43 
(32) min Therapeutic Exercise:  [x]  See flow sheet Rationale:    increase ROM, increase strength, improve coordination, improve balance and increase proprioception to promote increased functional mobility and increased activity tolerance with ADL's. 
8 min Manual Therapy: manual HS stretch, PROM knee extension left LE Rationale: Increase knee ROM and increase tissue extensibility to facilitate normal gait mechanics and increased ease with ADL's.  
  min Patient Education:  YES  Reviewed HEP [x]  Progressed/Changed HEP based on:   Advised pt to hold on prone knee hang for HEP, added supine 90/90 HS stretch instead and advised pt to continue standing gastroc and half moon stretch for ROM Other Objective/Functional Measures: 
 
Left knee AROM extension: 12 degrees Noted decreased edema and redness left distal LE with pt wearing compression stocking daily. Advised pt to continue daily wear. Added 90/90 HS stretch for ROM and SAQ's for strengthening. Post Treatment Pain Level (on 0 to 10) scale:   2  / 10 ASSESSMENT Assessment/Changes in Function: Pt progressing slowly with left knee ROM-increased AROM extension 3 degrees since  initial evaluation. []  See Progress Note/Recertification Patient will continue to benefit from skilled PT services to modify and progress therapeutic interventions, address functional mobility deficits, address ROM deficits, address strength deficits, analyze and address soft tissue restrictions, analyze and cue movement patterns, analyze and modify body mechanics/ergonomics and assess and modify postural abnormalities to attain remaining goals. Natalie Escobar Progress toward goals / Updated goals: 1. Patient will demonstrate compliance with HEP for symptom management at home. MET 2. Patient will ascend and descend 12 stairs in a reciprocal pattern, 1 UE assist to improve efficiency with community navigation. 3. Patient will demonstrates 10 degrees extension AROM to improve efficiency with WB ADL's. Progressing=12 degrees AROM  
 
· Long Term Goals: To be accomplished in  4-6  weeks: 1. Patient will be independent with HEP to self-manage and prevent symptoms upon DC. 2.  Patient will improve FOTO score to at least 63 points to indicate improved functional status. 3.  Patient will demonstrate at last 5/5 quadriceps MMT bilaterally to improve stability in gait. 4.  Patient will demonstrate at least 120 degrees flexion AROM to improve efficiency with stair negotiation. 5. Patient will demonstrate at least 5 degrees extension AROM to improve step length in gait. PLAN 
 
[]  Upgrade activities as tolerated YES Continue plan of care  
[]  Discharge due to :   
[]  Other:   
 
Therapist: Rama Mojica PTA Date: 4/11/2019 Time: 8:40 AM  
 
Future Appointments Date Time Provider Iglesia Mera 4/17/2019  8:00 AM Osito Segundo, [facility]  
 4/22/2019  8:00 AM Octavia Velasco [facility]  
4/25/2019  8:00 AM [facility]  
4/29/2019  8:00 AM [facility]

## 2019-04-15 ENCOUNTER — APPOINTMENT (OUTPATIENT)
Dept: PHYSICAL THERAPY | Age: 74
End: 2019-04-15
Payer: COMMERCIAL

## 2019-04-17 ENCOUNTER — HOSPITAL ENCOUNTER (OUTPATIENT)
Dept: PHYSICAL THERAPY | Age: 74
Discharge: HOME | End: 2019-04-17
Payer: COMMERCIAL

## 2019-04-17 PROCEDURE — 97110 THERAPEUTIC EXERCISES: CPT

## 2019-04-17 PROCEDURE — 97140 MANUAL THERAPY 1/> REGIONS: CPT

## 2019-04-17 NOTE — PROGRESS NOTES
PHYSICAL THERAPY - DAILY TREATMENT NOTE Patient Name: Radha Begun        Date: 2019 : 1945   YES Patient  Verified Visit #:     Insurance: Payor: Cindy Christensen / Plan: [address] PT / Product Type: Commerical / In time: 08:00 Out time: 08:53 Total Treatment Time: 48 Medicare/[facility] Time Tracking (below) Total Timed Codes (min):  53 1:1 Treatment Time:  45 TREATMENT AREA = Pain in left knee [M25.562] Postoperative pain of left knee [G89.18, M25.562] SUBJECTIVE Pain Level (on 0 to 10 scale):  0  / 10 Medication Changes/New allergies or changes in medical history, any new surgeries or procedures? NO    If yes, update Summary List  
Subjective Functional Status/Changes:  []  No changes reported \"I have not been walking with a cane for a few weeks now. \"  
 
  
 
OBJECTIVE Therapeutic Procedures: 
Min Procedure Specifics + Rationale  
n/a [x]  Patient Education (performed throughout session) [x] Review HEP [] Progressed/Changed HEP based on:  
[] proper performance and advancement of Therex/TA [] reduction in pain level   
[] increased functional capacity [] change in directional preference  
45(30) [x] Therapeutic Exercise [x]  See Flowsheet Rationale: increase ROM and increase strength to improve the patients ability to participate in ADL's   
8 [x]  Manual Therapy 
   
 [] STM/DTM     [] IASTM     [] Scar Massage [x] Joint mobs: Gr I [x] II [x]  III [x] IV[] V[]: 
Sup patella glides, hamstring stretching, Grade 1-3 posterior femoral mobs. Rationale: decrease pain, increase ROM, increase tissue extensibility, decrease trigger points and increase postural awareness to attain functional use and participation with ADL's Other Objective/Functional Measures: PT added HK ambulation, end range hip flexion lift off and increased reps/sets/resistance as noted on the flow sheet.  Mod vcs for knee valgus with functional activities. Pt responded well to cueing Knee AROM: 6-130 deg See flow sheet for more details. Progressed therex per flow sheet. Post Treatment Pain Level (on 0 to 10) scale:   0  / 10 ASSESSMENT Assessment/Changes in Function: Pt will continue to focus on knee extension deficits to facilitate normalized gait pattern .  
[]  See Plan of Care 
[]  See Progress Note/ Recertification 
[]  See Discharge Summary Patient will continue to benefit from skilled PT services to modify and progress therapeutic interventions, address functional mobility deficits, address ROM deficits, address strength deficits, analyze and address soft tissue restrictions, analyze and cue movement patterns, analyze and modify body mechanics/ergonomics, assess and modify postural abnormalities, address imbalance/dizziness and instruct in home and community integration  to attain remaining goals Progress toward goals / Updated goals: 1. Patient will demonstrate compliance with HEP for symptom management at home. Pt reporting compliance with HEP 2. Patient will ascend and descend 12 stairs in a reciprocal pattern, 1 UE assist to improve efficiency with community navigation. Continued to progress strengthening 3. Patient will demonstrates 10 degrees extension AROM to improve efficiency with WB ADL's. MET, see measurement above PLAN [x]  Upgrade activities as tolerated 
[x]  Update interventions per flow sheet YES Continue plan of care  
[]  Discharge due to :   
[]  Other:   
 
Therapist: Roberto Carlos Sanches DPT Date: 4/17/2019 Time: 7:17 AM  
 
Future Appointments Date Time Provider Iglesia Mera 4/17/2019  8:00 AM Estelita Goins [facility]  
4/19/2019  2:30 PM Estelita Goins PT [facility]  
4/22/2019  8:00 AM Estelita Goins [facility]  
4/25/2019  8:00 AM [facility]  
4/29/2019  8:00 AM [facility]

## 2019-04-19 ENCOUNTER — HOSPITAL ENCOUNTER (OUTPATIENT)
Dept: PHYSICAL THERAPY | Age: 74
Discharge: HOME | End: 2019-04-19
Payer: COMMERCIAL

## 2019-04-19 PROCEDURE — 97140 MANUAL THERAPY 1/> REGIONS: CPT

## 2019-04-19 PROCEDURE — 97110 THERAPEUTIC EXERCISES: CPT

## 2019-04-22 ENCOUNTER — HOSPITAL ENCOUNTER (OUTPATIENT)
Dept: PHYSICAL THERAPY | Age: 74
Discharge: HOME | End: 2019-04-22
Payer: COMMERCIAL

## 2019-04-22 PROCEDURE — 97110 THERAPEUTIC EXERCISES: CPT

## 2019-04-22 NOTE — PROGRESS NOTES
PHYSICAL THERAPY - DAILY TREATMENT NOTE Patient Name: rByan Jefferson        Date: 2019 : 1945   YES Patient  Verified Visit #:   10   of   12-18  Insurance: Payor: Neli Portillo / Plan: [employer]  CAPITATED PT / Product Type: Commerical / In time: 08:00 Out time: 08:43 Total Treatment Time: 37 Medicare/[employer]BS Time Tracking (below) Total Timed Codes (min):  43 1:1 Treatment Time:  37 TREATMENT AREA = Pain in left knee [M25.562] Postoperative pain of left knee [G89.18, M25.562] SUBJECTIVE Pain Level (on 0 to 10 scale):  1  / 10 Medication Changes/New allergies or changes in medical history, any new surgeries or procedures? NO    If yes, update Summary List  
Subjective Functional Status/Changes:  []  No changes reported \"I think I would need more PT\" OBJECTIVE Therapeutic Procedures: 
Min Procedure Specifics + Rationale  
n/a [x]  Patient Education (performed throughout session) [x] Review HEP [] Progressed/Changed HEP based on:  
[] proper performance and advancement of Therex/TA [] reduction in pain level   
[] increased functional capacity [] change in directional preference  
43(37) [x] Therapeutic Exercise [x]  See Flowsheet Rationale: increase ROM and increase strength to improve the patients ability to participate in ADL's Other Objective/Functional Measures: LE Strength Left Right Hip flexion 4/5 4/5 Hip extension 4/5 4/5 Hip abduction 4/5 4/5 Hip IR 4+/5 4+/5 Hip ER 4/5 4+/5 Knee extension 4+/5 4+/5 Knee flexion 4/5 4+/5 Ankle PF 4+/5 4+/5 Ankle DF 4+/5 4+/5 Left knee A/PROM: -127/WNL See flow sheet for more details. Progressed therex per flow sheet. Post Treatment Pain Level (on 0 to 10) scale:   0  / 10 ASSESSMENT Assessment/Changes in Function:  
 
See progress note  
[]  See Plan of Care 
[]  See Progress Note/ Recertification 
[]  See Discharge Summary Patient will continue to benefit from skilled PT services to modify and progress therapeutic interventions, address functional mobility deficits, address ROM deficits, address strength deficits, analyze and address soft tissue restrictions, analyze and cue movement patterns, analyze and modify body mechanics/ergonomics, assess and modify postural abnormalities, address imbalance/dizziness and instruct in home and community integration  to attain remaining goals Progress toward goals / Updated goals: 
See progress note PLAN [x]  Upgrade activities as tolerated 
[x]  Update interventions per flow sheet YES Continue plan of care  
[]  Discharge due to :   
[]  Other:   
 
Therapist: Mohit Osei DPT Date: 4/22/2019 Time: 7:30 AM  
 
Future Appointments Date Time Provider Iglesia Mera 4/22/2019  8:00 AM Corky Burden PT [facility]  
4/25/2019  8:00 AM [facility]  
4/29/2019  8:00 AM [facility]

## 2019-04-22 NOTE — PROGRESS NOTES
[facility] PHYSICAL THERAPY 
[address], [place], [address] - Phone: [phone]  Fax: [phone] Re-evaluation/CONTINUED PLAN OF CARE FOR PHYSICAL THERAPY Patient Name: Sussy Michael : 1945 Treatment/Medical Diagnosis: Pain in left knee [M25.562] Postoperative pain of left knee [G89.18, M25.562] Onset Date: DOS: 2019 Referral Source: Kristina Reed MD Start of Care [facility] [place]): 2019 Prior Hospitalization: See Medical History Provider #: [id number] Prior Level of Function: Independent with ADL's, 30' walks daily Comorbidities: HTN, Left foot ORIF  Medications: Verified on Patient Summary List  
Visits from [facility]: 10 Missed Visits: 0 Goal/Measure of Progress Goal Met? 1. Patient will demonstrate compliance with HEP for symptom management at home. Status at last Eval: NA Current Status: Compliant yes 2. Patient will ascend and descend 12 stairs in a reciprocal pattern, 1 UE assist to improve efficiency with community navigation. Status at last Eval: 15 Current Status: Able yes 3. Patient will demonstrates 10 degrees extension AROM to improve efficiency with WB ADL's. Status at last Eval: 15 Current Status: 9 yes SUMMARY OF TREATMENT Patient's POC has consisted of therex, therapeutic activities, manual therapy prn, modalities prn, pt. education, and a comprehensive HEP. Treatment strategies used to address functional mobility deficits, ROM deficits, strength deficits, analyze and address soft tissue restrictions, analyze and cue movement patterns, analyze and modify body mechanics/ergonomics, assess and modify postural abnormalities and instruct in home and community integration. Key Functional Changes/Progress: Ms. Jerad Arshad has been receiving PT at clinic for left knee pain s/p left knee TKR on 3/13/2019.  Since [facility], pt has demonstrated improvements in knee flexion/extension A/PROM however flexion appears to be progressing quicker than extension. Pt current left knee A/PROM: 9/5-127/WNL. 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 Pt able to descend/ascend stairs with unilateral HR. Pt noted decreased eccentric quad control on left compared to right. PT noted mild leg length discrepancy (left longer than right) which may be facilitating decreased left knee extension in static stance and swing phase of gait. Pt would benefit from continuing skilled PT to address functional deficits listed above. LE Strength 
  Left Right Hip flexion 4/5 4/5 Hip extension 4/5 4/5 Hip abduction 4/5 4/5 Hip IR 4+/5 4+/5 Hip ER 4/5 4+/5 Knee extension 4+/5 4+/5 Knee flexion 4/5 4+/5 Ankle PF 4+/5 4+/5 Ankle DF 4+/5 4+/5  
  
Left knee A/PROM: 9/5-127/WNL Problem List: pain affecting function, decrease ROM, decrease strength, edema affecting function, impaired gait/ balance, decrease ADL/ functional abilitiies, decrease activity tolerance, decrease flexibility/ joint mobility and decrease transfer abilities Treatment Plan may include any combination of the following: Therapeutic exercise, Therapeutic activities, Neuromuscular re-education, Physical agent/modality, Gait/balance training, Manual therapy, Aquatic therapy, Patient education, Self Care training, Functional mobility training, Home safety training and Stair training Patient Goal(s) has been updated and includes:  Same as SOC  
? Goals for this certification period include and are to be achieved in   4  weeks: 1. Patient will be independent with HEP to self-manage and prevent symptoms upon DC. 2.  Patient will improve FOTO score to at least 63 points to indicate improved functional status. 3.  Patient will demonstrate at last 5/5 quadriceps MMT bilaterally to improve stability in gait. 4. Patient will demonstrate at least 5 degrees extension AROM to improve step length in gait. Frequency / Duration:   Patient to be seen   2   times per week for   4    weeks: 
 
Assessments/Recommendations:Continue and progress functional therex/therapeutic activity as able, utilizing manual therapy and modalities prn. Progress patient towards independent HEP to facilitate self-management of symptoms and progress gains after PT. If you have any questions/comments please contact us directly at [phone]. Thank you for allowing us to assist in the care of your patient. Therapist Signature: Franck Unger DPT Date: 4/22/2019 Certification Period: 
Reporting Period: 03/22/2019-06/21/2019 03/22/2019-04/22/2019 Time: 7:31 AM  
NOTE TO PHYSICIAN:  PLEASE COMPLETE THE ORDERS BELOW AND FAX TO [facility] Physical Therapy: [phone]. If you are unable to process this request in 24 hours please contact our office: [phone]. 
 
___ I have read the above report and request that my patient continue as recommended.  
___ I have read the above report and request that my patient continue therapy with the following changes/special instructions: ________________________________________________  
___ I have read the above report and request that my patient be discharged from therapy.   
 
Physician Signature:       Date:      Time:

## 2019-04-25 ENCOUNTER — HOSPITAL ENCOUNTER (OUTPATIENT)
Dept: PHYSICAL THERAPY | Age: 74
Discharge: HOME | End: 2019-04-25
Payer: COMMERCIAL

## 2019-04-25 PROCEDURE — 97110 THERAPEUTIC EXERCISES: CPT

## 2019-04-25 PROCEDURE — 97530 THERAPEUTIC ACTIVITIES: CPT

## 2019-04-25 NOTE — PROGRESS NOTES
PHYSICAL THERAPY - DAILY TREATMENT NOTE Patient Name: Velia Walker        Date: 2019 : 1945   YES Patient  Verified Visit #:     Insurance: Payor: Reji Brownlee / Plan: [place] [employer]  CAPITATED PT / Product Type: Commerical / In time: 8:00 Out time: 8:39 Total Treatment Time: 44 Medicare/BCBS [place] Time Tracking (below) Total Timed Codes (min):  39 1:1 Treatment Time:  44 TREATMENT AREA =  Pain in left knee [M25.562] Postoperative pain of left knee [G89.18, M25.562] SUBJECTIVE Pain Level (on 0 to 10 scale):  1  / 10 Medication Changes/New allergies or changes in medical history, any new surgeries or procedures? NO    If yes, update Summary List  
Subjective Functional Status/Changes:  []  No changes reported Pt reports having less left knee pain at night and her back pain has improved since she started doing her back exercises again. OBJECTIVE 29 min Therapeutic Exercise:  [x]  See flow sheet Rationale:     Increase LE ROM/flexibility, increase strength and increase proprioception to improve the patients ability to perform ADL's and gait safely and I to allow for increased activity tolerance and increased functional mobility. 10 min Therapeutic Activity: Heel to toe gait, High knee and retro ambulation, sit<>stand Rationale:   improve coordination, improve balance and increase proprioception to improve the patients ability to perform ADLs, transfers and gait safely and independently. min Patient Education:  YES  Reviewed HEP []  Progressed/Changed HEP based on: Other Objective/Functional Measures: 
 
Pt demo's heel to toe gait pattern without LOB or safety concerns. Pt demo's compensatory rocking to A with transferring sit>stand without UE A. Added sit<>stand from elevated surface without UE A for LE strengthening.   
 
Assessed pt in standing with heels, buttocks and shoulders up against wall. Pt demo's left patella higher than right in stance. In pt's natural static stance pt demo's left knee flexion to compensate for leg length discrepancy. Post Treatment Pain Level (on 0 to 10) scale:   1  / 10 ASSESSMENT Assessment/Changes in Function:  
 
Pt demo's decreased strength LE's as evidenced by difficulty with sit>stand transfers. []  See Progress Note/Recertification Patient will continue to benefit from skilled PT services to modify and progress therapeutic interventions, address functional mobility deficits, address ROM deficits, address strength deficits, analyze and address soft tissue restrictions, analyze and cue movement patterns, analyze and modify body mechanics/ergonomics, assess and modify postural abnormalities, address imbalance/dizziness and instruct in home and community integration  to attain remaining goals Progress toward goals / Updated goals: 
Updated goals from last assessment: 1.  Patient will be independent with HEP to self-manage and prevent symptoms upon DC. 2.  Patient will improve FOTO score to at least 63 points to indicate improved functional status. 3.  Patient will demonstrate at last 5/5 quadriceps MMT bilaterally to improve stability in gait. tap downs for quad strength 4. Patient will demonstrate at least 5 degrees extension AROM to improve step length in gait. p PLAN 
 
[]  Upgrade activities as tolerated YES Continue plan of care  
[]  Discharge due to :   
[]  Other:   
 
Therapist: Machelle Keys PTA Date: 4/25/2019 Time: 8:10 AM  
 
Future Appointments Date Time Provider Iglesia Mera 4/29/2019  8:00 AM [facility]

## 2019-04-29 ENCOUNTER — HOSPITAL ENCOUNTER (OUTPATIENT)
Dept: PHYSICAL THERAPY | Age: 74
Discharge: HOME | End: 2019-04-29
Payer: COMMERCIAL

## 2019-04-29 PROCEDURE — 97530 THERAPEUTIC ACTIVITIES: CPT

## 2019-04-29 PROCEDURE — 97110 THERAPEUTIC EXERCISES: CPT

## 2019-04-29 NOTE — PROGRESS NOTES
PHYSICAL THERAPY - DAILY TREATMENT NOTE Patient Name: Sudhakar Kelley        Date: 2019 : 1945   YES Patient  Verified Visit #:   15   of   12-18  Insurance: Payor: Nestor Teixeira / Plan: [place] [employer]  CAPITATED PT / Product Type: Commerical / In time: 8:00 Out time: 8:40 Total Treatment Time: 40 Medicare/[facility] Time Tracking (below) Total Timed Codes (min):  40 1:1 Treatment Time:  40 TREATMENT AREA =  Pain in left knee [M25.562] Postoperative pain of left knee [G89.18, M25.562] SUBJECTIVE Pain Level (on 0 to 10 scale):  0  / 10 Medication Changes/New allergies or changes in medical history, any new surgeries or procedures? NO    If yes, update Summary List  
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 Pt states still gets sore at night in the back of her knee, but get better if she changes position, sometimes has to get up and walk around to loosen it up. Next orthopedic follow up appointment scheduled for May 14th. Pt reports she hasn't been doing a lot of strengthening exercises at home, but has been working on propping her heel up and stretching it for a few minutes at a time. OBJECTIVE 15 min Therapeutic Exercise:  [x]  See flow sheet Rationale:    Increase LE ROM/flexibility, increase strength and increase proprioception to improve the patients ability to perform ADL's and gait safely and I to allow for increased activity tolerance and increased functional mobility. 25 min Therapeutic Activity: 60' X 4 fast paced heel to toe walking with reciprocal arm swing, 60' X 2 gait with head turns, forward and lateral azra (small) step overs, SLS, stair negotiation Rationale:    improve coordination, improve balance and increase proprioception to improve the patients ability to perform ADLs, transfers and gait safely and independently. min Patient Education:  YES  Reviewed HEP []  Progressed/Changed HEP based on:   Advised pt to continue use of right heel lift to minimize affects of leg length difference. Educated pt that other options include getting a shoe lift which is added height on the outside of the shoe. Other Objective/Functional Measures: SLS: right 9\", left 11\" (multiple attempts each) Added azra step overs. Pt demo's no LOB or safety concerns with dynamic gait activity. Mod VCing for form with tap downs. Pt able to ascend and descend 3 steps without handrail and no safety concerns, but does demo decreased quad control left LE with eccentric lowering. Post Treatment Pain Level (on 0 to 10) scale:   0  / 10 ASSESSMENT Assessment/Changes in Function:  
 
Pt demo's good dynamic balance with gait activity. []  See Progress Note/Recertification Patient will continue to benefit from skilled PT services to modify and progress therapeutic interventions, address functional mobility deficits, address ROM deficits, address strength deficits, analyze and address soft tissue restrictions, analyze and cue movement patterns, analyze and modify body mechanics/ergonomics, assess and modify postural abnormalities, address imbalance/dizziness and instruct in home and community integration  to attain remaining goals Progress toward goals / Updated goals: 1.  Patient will be independent with HEP to self-manage and prevent symptoms upon DC. Pt progressing toward I with HEP, min to mod VCing for form 2.  Patient will improve FOTO score to at least 63 points to indicate improved functional status. Pt demo's good dynamic balance and stair negotiation without UE support 3.  Patient will demonstrate at last 5/5 quadriceps MMT bilaterally to improve stability in gait. tap downs for quad strength 4. Patient will demonstrate at least 5 degrees extension AROM to improve step length in gait. PLAN 
 
[]  Upgrade activities as tolerated YES Continue plan of care  
[]  Discharge due to :   
[]  Other:   
 
Therapist: Magdalena Felix PTA Date: 4/29/2019 Time: 8:40 AM  
 
Future Appointments Date Time Provider Iglesia Mera 5/2/2019  8:30 AM [facility]  
[facility]2019  [facility]  
5/9/2019  8:00 AM [facility]  
5/13/2019  8:30 AM [facility]  
5/16/2019  8:30 AM [facility]  
5/20/2019  8:30 AM [facility]  
5/23/2019  8:30 AM [facility]  
5/28/2019  8:00 AM [facility]  
5/30/2019  8:30 AM [facility]

## 2019-05-02 ENCOUNTER — HOSPITAL ENCOUNTER (OUTPATIENT)
Dept: PHYSICAL THERAPY | Age: 74
Discharge: HOME | End: 2019-05-02
Payer: COMMERCIAL

## 2019-05-02 PROCEDURE — 97110 THERAPEUTIC EXERCISES: CPT

## 2019-05-02 NOTE — PROGRESS NOTES
PHYSICAL THERAPY - DAILY TREATMENT NOTE Patient Name: Justin Russell        Date: 2019 : 1945   YES Patient  Verified Visit #:   15   of   12-18  Insurance: Payor: Kenya Benavides / Plan: [place] [employer]  CAPITATED PT / Product Type: Commerical / In time: 8:30 Out time: 9:10 Total Treatment Time: 40 Medicare/[facility] Time Tracking (below) Total Timed Codes (min):  40 1:1 Treatment Time:  30 TREATMENT AREA =  Pain in left knee [M25.562] Postoperative pain of left knee [G89.18, M25.562] SUBJECTIVE Pain Level (on 0 to 10 scale):  0  / 10 Medication Changes/New allergies or changes in medical history, any new surgeries or procedures? NO    If yes, update Summary List  
Subjective Functional Status/Changes:  []  No changes reported Pt reports having pain at night still, woke up 3-4 times last night, takes Advil before bed. \"I forgot to put in my heel lift today, this leg (right) feels really short. \" OBJECTIVE 
 
40 
(30) min Therapeutic Exercise:  [x]  See flow sheet Rationale:     Increase LE ROM/flexibility, increase strength and increase proprioception to improve the patients ability to perform ADL's and gait safely and I to allow for increased activity tolerance and increased functional mobility. min Patient Education:  YES  Reviewed HEP []  Progressed/Changed HEP based on: Other Objective/Functional Measures: 
 
Left knee AROM: 7-124 degrees Added SL hip abduction for strengthening. Kobe Nunezker for knee alignment with tap downs and for posture with standing hip abd/ext. Post Treatment Pain Level (on 0 to 10) scale:   0  / 10 ASSESSMENT Assessment/Changes in Function: Pt continues with decreased left knee extension AROM. []  See Progress Note/Recertification Patient will continue to benefit from skilled PT services to modify and progress therapeutic interventions, address functional mobility deficits, address ROM deficits, address strength deficits, analyze and address soft tissue restrictions, analyze and cue movement patterns, analyze and modify body mechanics/ergonomics, assess and modify postural abnormalities, address imbalance/dizziness and instruct in home and community integration  to attain remaining goals Progress toward goals / Updated goals: 1.  Patient will be independent with HEP to self-manage and prevent symptoms upon DC. Giovannimacario Segoviagurwinder for form 2.  Patient will improve FOTO score to at least 63 points to indicate improved functional status. 3.  Patient will demonstrate at last 5/5 quadriceps MMT bilaterally to improve stability in gait. 4. Patient will demonstrate at least 5 degrees extension AROM to improve step length in gait. 7 degrees today PLAN 
 
[]  Upgrade activities as tolerated YES Continue plan of care  
[]  Discharge due to :   
[]  Other:   
 
Therapist: Fredo Merrill PTA Date: 5/2/2019 Time: 8:55 AM  
 
Future Appointments Date Time Provider Iglesia Mera 5/7/2019  8:30 AM Octavia Velasco [facility]  
5/9/2019  8:00 AM [facility]  
5/13/2019  8:30 AM [facility]  
5/16/2019  8:30 AM [facility]  
5/20/2019  8:30 AM [facility]  
5/23/2019  8:30 AM [facility]  
5/28/2019  8:00 AM [facility]  
5/30/2019  8:30 AM [facility]

## 2019-05-07 ENCOUNTER — HOSPITAL ENCOUNTER (OUTPATIENT)
Dept: PHYSICAL THERAPY | Age: 74
Discharge: HOME | End: 2019-05-07
Payer: COMMERCIAL

## 2019-05-07 PROCEDURE — 97140 MANUAL THERAPY 1/> REGIONS: CPT

## 2019-05-07 PROCEDURE — 97150 GROUP THERAPEUTIC PROCEDURES: CPT

## 2019-05-07 PROCEDURE — 97110 THERAPEUTIC EXERCISES: CPT

## 2019-05-07 NOTE — PROGRESS NOTES
HYSICAL THERAPY - DAILY TREATMENT NOTE Patient Name: Radha Pretty        Date: 2019 : 1945   YES Patient  Verified Visit #:   15   of   12-18  Insurance: Payor: Jaziel Stanford / Plan: [place] [employer]  CAPITATED PT / Product Type: Commerical / In time: 830 Out time: 1678 Total Treatment Time: 50 Medicare/BCBS [place] Time Tracking (below) Total Timed Codes (min):  50 1:1 Treatment Time:  30 TREATMENT AREA =  Left knee pain s/p TKA SUBJECTIVE Pain Level (on 0 to 10 scale): 0  / 10 Medication Changes/New allergies or changes in medical history, any new surgeries or procedures? NO    If yes, update Summary List  
Subjective Functional Status/Changes:  []  No changes reported \"I'm doing much better. \" OBJECTIVE Therapeutic Procedures: 
Min Procedure Specifics + Rationale  
n/a [x]  Patient Education (performed throughout session) [x] Review HEP [] Progressed/Changed HEP based on:  
[] proper performance and advancement of Therex/TA [] reduction in pain level   
[] increased functional capacity [] change in directional preference  
35(15) [x] Therapeutic Exercise [x]  See Flowsheet Rationale: increase ROM and increase strength to improve the patients ability to participate in ADL's   
15 []  Manual Therapy 
   
 [] STM/DTM     [] IASTM     [] Scar Massage 
[] Cross-friction       [] PNF         [x] PROM [] TDN (see objective; actual needle insertion time not billed)  
[] Soft tissue mobz  
[] Joint mobilization: Gr I [] II []  III [] IV[] V[]: 
Rationale: decrease pain, increase ROM, increase tissue extensibility, decrease trigger points and increase postural awareness to attain functional use and participation with ADL's Other Objective/Functional Measures: 
 
Left knee active extension 4 degrees from neutral/passive 2 degrees from neutral 
  
Post Treatment Pain Level (on 0 to 10) scale:   0  / 10 ASSESSMENT Assessment/Changes in Function: Demonstrates knowledge of home program and progressing strength and range of motion gains in left lower extremity []  See Progress Note/Recertification Patient will continue to benefit from skilled PT services to analyze,, cue,, progress,, modify,, demonstrate,, instruct, and address, movement patterns,, therapeutic interventions,, postural abnormalities,, soft tissue restrictions,, ROM,, strength,, functional mobility,, body mechanics/ergonomics, and home and community integration, to attain remaining goals. Progress toward goals / Updated goals: 
 
Progressing towards goals PLAN [x]  Upgrade activities as tolerated YES Continue plan of care  
[]  Discharge due to :   
[]  Other:   
 
Therapist: Tia Frazier PT, DPT Date: 5/7/2019 Time: 9:37 AM  
 
Future Appointments Date Time Provider Iglesia Mera 5/13/2019  8:30 AM [facility]  
5/16/2019  8:30 AM [facility]  
5/20/2019  8:30 AM [facility]  
5/23/2019  8:30 AM [facility]  
5/28/2019  8:00 AM [facility]  
5/30/2019  8:30 AM [facility]

## 2019-05-09 ENCOUNTER — APPOINTMENT (OUTPATIENT)
Dept: PHYSICAL THERAPY | Age: 74
End: 2019-05-09
Payer: COMMERCIAL

## 2019-05-13 ENCOUNTER — HOSPITAL ENCOUNTER (OUTPATIENT)
Dept: PHYSICAL THERAPY | Age: 74
Discharge: HOME | End: 2019-05-13
Payer: COMMERCIAL

## 2019-05-13 PROCEDURE — 97530 THERAPEUTIC ACTIVITIES: CPT

## 2019-05-13 PROCEDURE — 97110 THERAPEUTIC EXERCISES: CPT

## 2019-05-13 NOTE — PROGRESS NOTES
PHYSICAL THERAPY - DAILY TREATMENT NOTE Patient Name: Cindy Stewart        Date: 2019 : 1945   YES Patient  Verified Visit #:   15   of   12-18  Insurance: Payor: Helena Barrett / Plan: [address] PT / Product Type: Commerical / In time: 8:30 Out time: 9:00 Total Treatment Time: 30 Medicare/BCBS [place] Time Tracking (below) Total Timed Codes (min):  30 1:1 Treatment Time:  30 TREATMENT AREA =  Left knee pain s/p TKA SUBJECTIVE Pain Level (on 0 to 10 scale):  0  / 10 Medication Changes/New allergies or changes in medical history, any new surgeries or procedures? NO    If yes, update Summary List  
Subjective Functional Status/Changes:  []  No changes reported Pt reports wakes about 3 times during the night because of her left knee hurting, but doesn't have to get out of bed anymore, can just move around and readjust, then she can go back to sleep. Pt reports 2/10 left knee pain with her daily activities. Wears right heel lift most days, but has gone without it in some of her sandals and still feels fine. OBJECTIVE 20 min Therapeutic Exercise:  [x]  See flow sheet Rationale:     Increase LE ROM/flexibility, increase strength and increase proprioception to improve the patients ability to perform ADL's and gait safely and I to allow for increased activity tolerance and increased functional mobility. 10 min Therapeutic Activity: SLS, FOTO Rationale:   improve coordination, improve balance and increase proprioception to improve the patients ability to perform ADLs, transfers and gait safely and independently. min Patient Education:  YES  Reviewed HEP []  Progressed/Changed HEP based on:   Pt reports compliance with HEP. Other Objective/Functional Measures: FOTO: 57/100 Left knee AROM: 7-125 degrees  
 
left SLS: 7 seconds MMT left quads 4/5 Pt able to demo SLR without quad lag. Post Treatment Pain Level (on 0 to 10) scale:   0  / 10 ASSESSMENT Assessment/Changes in Function:  
 
Pt's FOTO score improved 6  points since last assessment indicating pt is an I community ambulator and correlates to a 43% functional limitation. [x]  See Progress Note/Recertification Patient will continue to benefit from skilled PT services to modify and progress therapeutic interventions, address functional mobility deficits, address ROM deficits, address strength deficits, analyze and address soft tissue restrictions, analyze and cue movement patterns, analyze and modify body mechanics/ergonomics, assess and modify postural abnormalities, address imbalance/dizziness and instruct in home and community integration  to attain remaining goals Progress toward goals / Updated goals: 1.  Patient will be independent with HEP to self-manage and prevent symptoms upon DC. Pt progressing toward I with HEP, min VCing for form 2.  Patient will improve FOTO score to at least 63 points to indicate improved functional status. Progressing=57/100 3.  Patient will demonstrate at last 5/5 quadriceps MMT bilaterally to improve stability in gait. Progressing=4/5 4. Patient will demonstrate at least 5 degrees extension AROM to improve step length in gait. Progressing=7 degrees PLAN 
 
[]  Upgrade activities as tolerated YES Continue plan of care  
[]  Discharge due to :   
[]  Other:   
 
Therapist: Mihaela Torres PTA Date: 5/13/2019 Time: 8:32 AM  
 
Future Appointments Date Time Provider Iglesia Mera 5/16/2019  [facility]  
5/20/2019  8:[facility]  
5/23/2019  8:30 AM [facility]  
5/28/2019  8:00 AM [facility]  
5/30/2019  8:30 AM [facility]

## 2019-05-14 NOTE — PROGRESS NOTES
[address] [facility] PHYSICAL THERAPY 
[address], [place], [address] - Phone: [phone]  Fax: [phone] CONTINUED PLAN OF CARE/RECERTIFICATION FOR PHYSICAL THERAPY Patient Name: Tien Bravo : 1945 Treatment/Medical Diagnosis: Pain in left knee [M25.562] Postoperative pain of left knee [G89.18, M25.562] Onset Date: 3/5/19 Referral Source: Bradford Claude, MD [place] [facility]): 3/22/19 Prior Hospitalization: See Medical History Provider #: [id number] Prior Level of Function: Independent with ADL's Comorbidities: HTN, Left foot ORIF  Medications: Verified on Patient Summary List  
Visits from [facility]: 15 Missed Visits: 0 Goal/Measure of Progress Goal Met? 1. Patient will be independent with HEP to self-manage and prevent symptoms upon DC. Status at last Eval: NA Current Status: Progressing Progressing 2. Patient will improve FOTO score to at least 63 points to indicate improved functional status. Status at last Eval: 51 Current Status: 62 Progressing 3. Patient will demonstrate at last 5/5 quadriceps MMT bilaterally to improve stability in gait. Status at last Eval: 4+ Current Status: 4 no 4. Patient will demonstrate at least 5 degrees extension AROM to improve step length in gait. Status at last Eval: 9 AROM Current Status: 7 AROM Progressing Patient's POC has consisted of therex, therapeutic activities, manual therapy prn, modalities prn, pt. education, and a comprehensive HEP. Treatment strategies used to address functional mobility deficits, ROM deficits, strength deficits, analyze and address soft tissue restrictions, analyze and cue movement patterns, analyze and modify body mechanics/ergonomics, assess and modify postural abnormalities and instruct in home and community integration. Key Functional Changes/Progress: The pt is progressing in strength and AROM. The patient is currently functionally independent including stair negotiation, ambulation without AD for prolonged distances, and all transfers. However, the patient still stands without TKE due to a significant leg length discrepancy. The pt was educated to speak to her physician regarding the benefit of adding a heel lift to the bottom of her shoes in addition to the heel lift issued in the clinic to improve tolerance for prolonged standing. Problem List: pain affecting function, decrease ROM, decrease strength, impaired gait/ balance and decrease ADL/ functional abilitiies Treatment Plan may include any combination of the following: Therapeutic exercise, Therapeutic activities, Neuromuscular re-education, Physical agent/modality, Gait/balance training, Manual therapy and Patient education ? Goals for this certification period include and are to be achieved in   1  treatments: 1. Patient will be independent with HEP to self-manage and prevent symptoms upon DC. Frequency / Duration:   Patient to be seen   1   times per week for   1    treatments: 
Assessments/Recommendations: The patient would continue to benefit from skilled interventions to address the above mentioned functional deficits. See above for more details. If you have any questions/comments please contact us directly at [phone]. Thank you for allowing us to assist in the care of your patient. Therapist Signature: Lois Rojas DPT Date: 5/14/2019 Certification Period: 
Reporting Period: 3/22/19 - 6/21/19 4/22/19 - 5/14/19 Time: 9:19 AM  
NOTE TO PHYSICIAN:  PLEASE COMPLETE THE ORDERS BELOW AND FAX TO [facility]: [phone].  
If you are unable to process this request in 24 hours please contact our office: [phone]. 
 
___ I have read the above report and request that my patient continue as recommended.  
___ I have read the above report and request that my patient continue therapy with the following changes/special instructions: ________________________________________________  
___ I have read the above report and request that my patient be discharged from therapy.   
 
Physician Signature:       Date:      Time:

## 2019-05-16 ENCOUNTER — HOSPITAL ENCOUNTER (OUTPATIENT)
Dept: PHYSICAL THERAPY | Age: 74
Discharge: HOME | End: 2019-05-16
Payer: COMMERCIAL

## 2019-05-16 PROCEDURE — 97110 THERAPEUTIC EXERCISES: CPT

## 2019-05-16 NOTE — PROGRESS NOTES
PHYSICAL THERAPY - DAILY TREATMENT NOTE Patient Name: Sussy Michael        Date: 2019 : 1945   YES Patient  Verified Visit #:     Insurance: Payor: Kole Costahire / Plan: [facility]  CAPITATED PT / Product Type: Commerical / In time: 8:30 Out time: 9:09 Total Treatment Time: 44 Medicare/[facility] Time Tracking (below) Total Timed Codes (min):  39 1:1 Treatment Time:  44 TREATMENT AREA =  Pain in left knee [M25.562] Postoperative pain of left knee [G89.18, M25.562] SUBJECTIVE Pain Level (on 0 to 10 scale):  0  / 10 Medication Changes/New allergies or changes in medical history, any new surgeries or procedures? NO    If yes, update Summary List  
Subjective Functional Status/Changes:  []  No changes reported Pt reports PA told her that she could continue or wrap up with therapy whenever she felt she was ready, pt states she is ready for discharge today and will continue the exercises at home. Pt states that the PA told her that he thinks as she walks more the leg length difference won't bother her as much, but she can continue with the heel lift as needed. Pt reports doesn't have to follow up with the MD for another year. OBJECTIVE 39 min Therapeutic Exercise:  [x]  See flow sheet Rationale:   increase ROM, increase strength, improve coordination, improve balance and increase proprioception to promote increased functional mobility and increased activity tolerance with ADL's. 
  
 min Patient Education:  YES  Reviewed HEP [x]  Progressed/Changed HEP based on:   Patient given updated HEP and instructed to continue independently Other Objective/Functional Measures: 
 
Reviewed form for therapeutic exercise, pt felix's I with ex program.  
 
SLS=15\" B Post Treatment Pain Level (on 0 to 10) scale:   0  / 10 ASSESSMENT Assessment/Changes in Function:  
 
Pt felix's good functional mobility moving around clinic: ambulation, bed mobility and transferring sit<>stand. [x]  See Progress Note/Recertification Patient will continue to benefit from skilled PT services to NA Progress toward goals / Updated goals: 1. Patient will be independent with HEP to self-manage and prevent symptoms upon DC. MET 
 
PLAN 
 
[]  Upgrade activities as tolerated NA Continue plan of care [x]  Discharge due to : program completed   
[]  Other:   
 
Therapist: Sherif Delaney PTA Date: 5/16/2019 Time: 8:31 AM  
 
Future Appointments Date Time Provider Iglesia Mera 5/20/2019  8:30 AM [facility]  
5/23/2019  8:30 AM [facility]  
5/28/2019  8:00 AM [facility]  
5/30/2019  8:30 AM [facility]

## 2019-05-20 ENCOUNTER — APPOINTMENT (OUTPATIENT)
Dept: PHYSICAL THERAPY | Age: 74
End: 2019-05-20
Payer: COMMERCIAL

## 2019-05-23 ENCOUNTER — APPOINTMENT (OUTPATIENT)
Dept: PHYSICAL THERAPY | Age: 74
End: 2019-05-23
Payer: COMMERCIAL

## 2019-05-28 ENCOUNTER — APPOINTMENT (OUTPATIENT)
Dept: PHYSICAL THERAPY | Age: 74
End: 2019-05-28
Payer: COMMERCIAL

## 2019-05-30 ENCOUNTER — APPOINTMENT (OUTPATIENT)
Dept: PHYSICAL THERAPY | Age: 74
End: 2019-05-30
Payer: COMMERCIAL

## 2020-01-15 ENCOUNTER — HOSPITAL ENCOUNTER (OUTPATIENT)
Dept: MAMMOGRAPHY | Age: 75
Discharge: HOME | End: 2020-01-15
Payer: MEDICARE

## 2020-01-15 DIAGNOSIS — Z12.31 ENCOUNTER FOR SCREENING MAMMOGRAM FOR BREAST CANCER: ICD-10-CM

## 2020-01-15 PROCEDURE — 77063 BREAST TOMOSYNTHESIS BI: CPT

## 2020-10-02 ENCOUNTER — HOSPITAL ENCOUNTER (OUTPATIENT)
Dept: PHYSICAL THERAPY | Age: 75
Discharge: HOME | End: 2020-10-02
Payer: MEDICARE

## 2020-10-02 PROCEDURE — 97110 THERAPEUTIC EXERCISES: CPT

## 2020-10-02 PROCEDURE — 97162 PT EVAL MOD COMPLEX 30 MIN: CPT

## 2020-10-02 NOTE — PROGRESS NOTES
[address] [facility] PHYSICAL THERAPY  [address], [facility], [address] - Phone: [phone]  Fax: [phone] / [address] [facility]  Patient Name: Cindy Hunter : 1945   Medical   Diagnosis: Low back pain [M54.5] Treatment Diagnosis: Low back pain [M54.5]   Onset Date: Chronic with recent worsening     Referral Source: Roly Sr MD Start of Care [facility]): 10/2/2020   Prior Hospitalization: See medical history Provider #: [id number]   Prior Level of Function: Independent with ADLs, ambulation; doing housework, gardening; exercising regularly   Comorbidities: arthritis, HTN, left TKR, B foot surgery, sleep apnea, B cataract surgery   Medications: Verified on Patient Summary List   The Plan of Care and following information is based on the information from the initial evaluation.   ===========================================================================================  Assessment / key information:  Patient is a 66 y.o. female who presents with complaints of chronic lower back pain with recent worsening, pain is intermittent in nature and extends to right buttock at times. Patient demonstrates postural impairments, decreased L/S ROM, decreased core/B hip strength, decreased position/activity tolerance and impaired ADL/IADL function, functional mobility and gait. Patient would benefit from skilled PT services to address these issues and improve function.   Thank you for this referral.    FOTO: 56/100, stage 4, little difficulty performing usual work or household activities and hobbies  ==========================================================================================  Eval Complexity: History: HIGH Complexity :3+ comorbidities / personal factors will impact the outcome/ POC Exam:HIGH Complexity : 4+ Standardized tests and measures addressing body structure, function, activity limitation and / or participation in recreation  Presentation: MEDIUM Complexity : Evolving with changing characteristics  Clinical Decision Making:MEDIUM Complexity : FOTO score of 26-74Overall Complexity:MEDIUM    Problem List: pain affecting function, decrease ROM, decrease strength, impaired gait/ balance, decrease ADL/ functional abilitiies, decrease activity tolerance, decrease flexibility/ joint mobility and decrease transfer abilities   Treatment Plan may include any combination of the following: Therapeutic exercise, Therapeutic activities, Neuromuscular re-education, Physical agent/modality, Gait/balance training, Manual therapy, Patient education, Functional mobility training and Stair training  Patient / Family readiness to learn indicated by: asking questions, trying to perform skills and interest  Persons(s) to be included in education: patient (P)  Barriers to Learning/Limitations: None  Measures taken:    Patient Goal (s): \"Less pain and therefore no medication, movements easier. \"   Patient self reported health status: good  Rehabilitation Potential: good   Short Term Goals: To be accomplished in  2-3  weeks:  1. Patient will demonstrate compliance with HEP. 2. Patient will report less than or equal to 6/10 pain at worst to allow increased activity tolerance. 3. Patient will demonstrate 4/5 right gluteus medius strength to facilitate gait stability.  Long Term Goals: To be accomplished in  4-6  weeks:  1. Patient will demonstrate independence with HEP. 2. Patient will report less than or equal to 4/10 pain at worst to allow increased activity tolerance. 3. Patient will score greater than or equal to 60/100 on FOTO to indicate improved function.   Frequency / Duration:   Patient to be seen  2  times per week for 4-6  weeks:  Patient / Caregiver education and instruction: activity modification and exercises    Therapist Signature: Gavin Chowdhury PT Date: 14/0/3645   Certification Period: 10/2/2020-1/1/2021 Time: 11:06 AM   ===========================================================================================  I certify that the above Physical Therapy Services are being furnished while the patient is under my care. I agree with the treatment plan and certify that this therapy is necessary. Physician Signature:        Date:       Time:     Please sign and return to [facility] or you may fax the signed copy to [phone]. Thank you.

## 2020-10-02 NOTE — PROGRESS NOTES
PHYSICAL THERAPY - DAILY TREATMENT NOTE    Patient Name: Westley Esquivel        Date: 10/2/2020  : 1945   YES Patient  Verified  Visit #:   1     Insurance: Payor: [place]ana Villa / Plan: [facility] [place] & [facility] / Product Type: Medicare /      In time: 11:03 Out time: 11:47   Total Treatment Time: 44     Medicare/BCBS [place] Time Tracking (below)   Total Timed Codes (min):  14 1:1 Treatment Time:  14     TREATMENT AREA =  Chronic back pain    SUBJECTIVE    Pain Level (on 0 to 10 scale):  1  / 10 LBP   Medication Changes/New allergies or changes in medical history, any new surgeries or procedures? NO    If yes, update Summary List   Subjective Functional Status/Changes:  []  No changes reported     Pt reports progressive worsening of back pain. Pt reports that she has been doing exercises, but they do not seem to be relieving pain. Pt reports that she does pelvic tilt, knees to chest, lower trunk rotation, SLR. Pt reports that she has been walking on TM at gym, walking dog and doing arm strengthening exercises at gym. Pt reports that she has had to begin taking medication for the pain, but would like to be able to stop taking medication for the pain. Pt reports that she has lower back pain and intermittent right buttock pain. Pt reports h/o sciatica. Pt denies numbness/tingling/weakness. OBJECTIVE    Physical Therapy Evaluation - Lumbar Spine ([facility])    SUBJECTIVE  Chief Complaint:    Mechanism of injury:    Symptoms:  Pain rating (0-10):    Today: 1   Best: 0    Worst: 9   [] Constant:    [x] Intermittent: LBP, R buttock pain     Aggravated by:   [] Bending [] Sitting [x] Standing [x] Walking   [] Moving [] Cough [] Sneeze [] Valsalva   [] AM  [] PM  Lying:  [] sup   [] pro   [] sidelying   [x] Other: lifting     Eased by:    [x] Bending [x] Sitting [] Standing [] Walking   [x] Moving [] AM  [] PM  Lying: [x] sup  [] pro  [] sidelying   [x] Other: exercises, Tylenol     General Health: Red Flags Indicated? [] Yes    [x] No  [] Yes [] No Recent weight change (If yes, due to dieting?  [] Yes  [] No)   [] Yes [] No Weakness in legs during walking  [] Yes [] No Unremitting pain at night  [] Yes [] No Abdominal pain or problems  [] Yes [] No Rectal bleeding  [] Yes [] No Feet more cold or painful in cold weather  [] Yes [] No Menstrual irregularities  [] Yes [] No Blood or pain with urination  [] Yes [] No Dysfunction of bowel or bladder  [] Yes [] No Recent illness within past 3 weeks (i.e, cold, flu)  [] Yes [] No Numbness/tingling in buttock/genitalia region    Past History/Treatments: Prior PT for back pain ~ 10 years ago    Diagnostic Tests: [] Lab work [] X-rays    [] CT [] MRI     [] Other:  Results:    Functional Status  Prior level of function: Independent, doing housework, gardening, exercising  Present functional limitations: Pain with prolonged standing, walking, gardening  What position do you sleep in?: Supine    OBJECTIVE  Posture:  Lateral Shift: [] R    [x] L     [x] +  [] -  Kyphosis: [x] Increased [] Decreased   []  WNL  Lordosis:  [] Increased [x] Decreased   [] WNL  Pelvic symmetry: [x] WNL    [] Other:    Gait:  [] Normal     [x] Abnormal: Decreased gait speed    Active Movements: [] N/A   [] Too acute   [] Other:  ROM % AROM % PROM Comments:pain, area   Forward flexion 40-60 50%  \"Stiffness\"   Extension 20-30 50%  NE   SB right 20-30 50%  \"Pulling lower back\"   SB left 20-30 50%  \"Pulling lower back\"   Rotation right 5-10 75%  \"Pain mid back\"   Rotation left 5-10 75%  \"Creaking lower back\"     Repeated Movements   Effects on present pain: produces (NC), abolishes (A), increases (incr), decreases (decr), centralizes (C), peripheral (PH), no effect (NE)   Pre-Test Sx Flexion Repeated Flexion Extension Repeated Extension Repeated SBL Repeated SBR   Sitting LBP \"Feels good\" \"Feels good\"       Standing LBP \"Tightness\"        Lying LBP \"Feels good\"    N/A N/A   Comments:  Side Glide: Right side glide increased LBP during, NE after  Sustained passive positioning test:    Neuro Screen [x] WNL  Myotome/Dermatome/Reflexes:  Comments:    Dural Mobility:  SLR Sitting: [] R    [] L    [] +    [] -  @ (degrees):           Supine: [] R    [] L    [] +    [] -  @ (degrees):   Slump Test: [] R    [] L    [] +    [] -  @ (degrees):   Prone Knee Bend: [] R    [] L    [] +    [] -     Palpation  [] Min  [] Mod  [] Severe    Location:  [] Min  [] Mod  [] Severe    Location:  [] Min  [] Mod  [] Severe    Location:    Stabilization Tests  Multifidus Test  Level 1: Prone abdominal draw in (Goal 6-10mmHG):  Level 2: Supported leg load supine (needle deflection at 40mmHG): [] Yes  [] No   Level 3: Unsupported leg load supine (needle deflection at 40mmHG): [] Yes  [] No     Strength   L(0-5) R (0-5) N/T   Hip Flexion (L1,2) 4 4 []   Knee Extension (L3,4) 5 5 []   Ankle Dorsiflexion (L4) 5 5 []   Great Toe Extension (L5) 5 5 []   Ankle Plantarflexion (S1) 5 5 []   Knee Flexion (S1,2) 5 5 []   Upper Abdominals   [x]   Lower Abdominals   [x]   Paraspinals   [x]   Back Rotators   [x]   Gluteus Vic 4- 4- []   Other - Gluteus Medius 4 4- []     Special Tests  Lumbar:  Lumb.  Compression: [] Pos  [] Neg               Lumbar Distraction:   [] Pos  [] Neg    Quadrant:  [] Pos  [] Neg   [] Flex  [] Ext    Sacroilliac:  Gaenslen's: [] R    [] L    [] +    [] -     Compression: [] +    [] -     Gapping:  [] +    [] -     Thigh Thrust: [] R    [] L    [] +    [] -     Leg Length: [] +    [] -   Position:    Crests:    ASIS:    PSIS:    Sacral Sulcus:    Mobility: Standing flex:     Sitting flex:     Supine to sit:     Prone knee bend:         Hip: Maricruz [place]:  [] R    [] L    [] +    [] -     Scour:  [] R    [] L    [] +    [] -     Piriformis: [] R    [] L    [] +    [] -          Deficits: Duglas's: [] R    [] L    [] +    [] -     Donta: [] R    [] L    [] +    [] -     Hamstrings 90/90: WNL B    Gastrocsoleus (to neutral): Right: Left:       Global Muscular Weakness:  Abdominals:  Quadratus Lumborum:  Paraspinals:   Other:    Other tests/comments:  Left knee flexion contracture    14 min Therapeutic Exercise:  [x]  See flow sheet   Rationale:      increase ROM, increase strength and increase proprioception to improve the patients ability to perform ADLs/IADLs, functional mobility and gait safely and independently without increased pain/symptoms     During TE min Patient Education:  YES  Reviewed HEP   [x]  Progressed/Changed HEP based on:   Initiated HEP     Other Objective/Functional Measures:    See eval    Initiated exercises per flowsheet     Post Treatment Pain Level (on 0 to 10) scale:   2  / 10     ASSESSMENT    Assessment/Changes in Function:     See plan of care    Justification for Eval Code Complexity:  Patient History : HIGH - arthritis, HTN, left TKR, B foot surgery, sleep apnea  Examination HIGH - See objective  Clinical Presentation: MEDIUM  Clinical Decision Making : MEDIUM - FOTO 56/100     []  See Progress Note/Recertification   Patient will continue to benefit from skilled PT services: see plan of care   Progress toward goals / Updated goals:    See plan of care     PLAN    [x]  Upgrade activities as tolerated YES Continue plan of care   []  Discharge due to :    []  Other:      Therapist: Shari Mark, PT    Date: 10/2/2020 Time: 11:06 AM

## 2020-10-05 ENCOUNTER — HOSPITAL ENCOUNTER (OUTPATIENT)
Dept: PHYSICAL THERAPY | Age: 75
Discharge: HOME | End: 2020-10-05
Payer: MEDICARE

## 2020-10-05 PROCEDURE — 97110 THERAPEUTIC EXERCISES: CPT

## 2020-10-05 NOTE — PROGRESS NOTES
PHYSICAL THERAPY - DAILY TREATMENT NOTE    Patient Name: Westley Esquivel        Date: 10/5/2020  : 1945   YES Patient  Verified  Visit #:   2     Insurance: Payor: Santana Villa / Plan: [facility] MEDICARE PART A & B / Product Type: Medicare /      In time: 2:01 Out time: 2:45   Total Treatment Time: 44     Medicare/BCBS [place] Time Tracking (below)   Total Timed Codes (min):  44 1:1 Treatment Time:  44     TREATMENT AREA =  Low back pain [M54.5]    SUBJECTIVE    Pain Level (on 0 to 10 scale):  3  / 10   Medication Changes/New allergies or changes in medical history, any new surgeries or procedures? NO    If yes, update Summary List   Subjective Functional Status/Changes:  []  No changes reported     Pt reports no soreness after the initial evaluation. Pt c/o soreness across her lower back, no sciatica currently. Pt states back pain with prolonged sitting and gardening. OBJECTIVE    44 min Therapeutic Exercise:  [x]  See flow sheet   Rationale:   Increase core strength/stabilization, ROM/flexibility to improve pt's ability to perform ADL's with increased ease and less pain to promote increased activity tolerance. min Patient Education:  YES  Reviewed HEP   [x]  Progressed/Changed HEP based on:   Patient with no questions, continue same HEP     Other Objective/Functional Measures:    Reviewed form for HEP, min VCing required and no c/o. TCing and VCing to facilitate TA recruitment with core ex. Pt reports ms fatigue right>left LE with therapeutic exercise. Instructed pt in RENEE. Pt to trial RENEE next time she is experiencing increased LBP and assess symptom response. Pt verbalized understanding.       Post Treatment Pain Level (on 0 to 10) scale:   2  / 10     ASSESSMENT    Assessment/Changes in Function:     Pt with good tolerance to initiation of strength ex.      []  See Progress Note/Recertification   Patient will continue to benefit from skilled PT services to modify and progress therapeutic interventions, address functional mobility deficits, address ROM deficits, address strength deficits, analyze and address soft tissue restrictions, analyze and cue movement patterns, assess and modify postural abnormalities and instruct in home and community integration to attain remaining goals. Progress toward goals / Updated goals:    1. Patient will demonstrate compliance with HEP. Pt reports compliance with HEP issued at  initial evaluation  2. Patient will report less than or equal to 6/10 pain at worst to allow increased activity tolerance. 3. Patient will demonstrate 4/5 right gluteus medius strength to facilitate gait stability.      PLAN    []  Upgrade activities as tolerated YES Continue plan of care   []  Discharge due to :    []  Other:      Therapist: Nicole Ibarra PTA    Date: 10/5/2020 Time: 2:08 PM     Future Appointments   Date Time Provider Iglesia Mera   10/9/2020  2:00 PM Rachael Locke [place] [facility]   10/13/2020  2:00 PM Alexander Hogan [facility]   10/16/2020 10:15 AM Richie An PT [facility]   10/20/2020 10:15 AM Vena Lull [facility]   10/23/2020 10:15 AM Richie An PT [facility]   10/27/2020 11:00 AM Vena Lull [facility]   10/30/2020 10:15 AM Richie An PT [facility]

## 2020-10-09 ENCOUNTER — HOSPITAL ENCOUNTER (OUTPATIENT)
Dept: PHYSICAL THERAPY | Age: 75
Discharge: HOME | End: 2020-10-09
Payer: MEDICARE

## 2020-10-09 PROCEDURE — 97530 THERAPEUTIC ACTIVITIES: CPT

## 2020-10-09 PROCEDURE — 97110 THERAPEUTIC EXERCISES: CPT

## 2020-10-09 NOTE — PROGRESS NOTES
PHYSICAL THERAPY - DAILY TREATMENT NOTE    Patient Name: Edith See        Date: 10/9/2020  : 1945   yes Patient  Verified  Visit #:   3     Insurance: Payor: Mara Josue / Plan: [place] MEDIC[facility] PART A & B / Product Type: Medicare /      In time: 156 Out time: 239   Total Treatment Time: 43     Medicare/BCBS Time Tracking (below)   Total Timed Codes (min):  43 1:1 Treatment Time:  43     TREATMENT AREA =  Low back pain [M54.5]    SUBJECTIVE  Pain Level (on 0 to 10 scale):  1  / 10   Medication Changes/New allergies or changes in medical history, any new surgeries or procedures?    no  If yes, update Summary List   Subjective Functional Status/Changes:  []  No changes reported     \"I am not too bad, the extensions help\"          OBJECTIVE      28 min Therapeutic Exercise:  [x]  See flow sheet   Rationale:      increase ROM, increase strength and improve coordination to improve the patients ability to safely perform ADLs/ bending/stooping/ lifting/prolong sitting, stding and amb/ stairs with minimal to no pain         15 min Therapeutic Activity: [x]  See flow sheet  postural/bed mobility training  bridges for bed mobility   piriformis str for donning/doffing shoes and socks with ease  hip hinge to promote neutral spine for bending/lifting/transfers   Rationale:    increase ROM, increase strength and improve coordination to improve the patients ability to safely perform ADLs/ bending/stooping/ lifting/prolong sitting, stding and amb/ stairs with minimal to no pain      Billed With/As:   [x] TE   [x] TA   [] Neuro   [] Self Care Patient Education: [x] Review HEP    [] Progressed/Changed HEP based on:   [x] positioning   [x] body mechanics   [x] transfers   [] heat/ice application    [x] other: Pt ed on importance and benefits of compliance with HEP, core strength/stability and proper posture; pt verbalized understanding       Other Objective/Functional Measures:    VCs + demo to perform proper technique for TE  Initiated LAQS and hip hinge + sit<>stand without c/o p!  hip hinge to promote neutral spine for bending/lifting/transfers  difficulty keeping upright posture         Post Treatment Pain Level (on 0 to 10) scale:   0  / 10     ASSESSMENT  Assessment/Changes in Function:     Progressed there-ex without c/o increase p!  demos poor posture; improved with VCs. demos fair bridge form     []  See Progress Note/Recertification   Patient will continue to benefit from skilled PT services to modify and progress therapeutic interventions, address functional mobility deficits, address ROM deficits, address strength deficits, analyze and address soft tissue restrictions, analyze and cue movement patterns, analyze and modify body mechanics/ergonomics, assess and modify postural abnormalities and instruct in home and community integration to attain remaining goals. Progress toward goals / Updated goals: · Short Term Goals: To be accomplished in  2-3  weeks:  1. Patient will demonstrate compliance with HEP. Established HEP  2. Patient will report less than or equal to 6/10 pain at worst to allow increased activity tolerance. 3. Patient will demonstrate 4/5 right gluteus medius strength to facilitate gait stability. · Long Term Goals: To be accomplished in  4-6  weeks:  1. Patient will demonstrate independence with HEP. 2. Patient will report less than or equal to 4/10 pain at worst to allow increased activity tolerance. 3. Patient will score greater than or equal to 60/100 on FOTO to indicate improved function.      PLAN  [x]  Upgrade activities as tolerated yes Continue plan of care   []  Discharge due to :    []  Other:      Therapist: Dustin Barrios PTA    Date: 10/9/2020 Time: 2:46 PM     Future Appointments   Date Time Provider Iglesia Mera   10/13/2020  2:00 PM Magnolia Hunter [facility]   10/16/2020 10:15 AM Armando Ventura PT [facility]   10/20/2020 10:15 AM Carmencita HERNANDEZ [facility] 10/23/2020 10:15 AM Linda Daily, PT [facility]   10/27/2020 11:00 AM Chay Tsai [facility]   10/30/2020 10:15 AM Linda Daily, PT [facility]

## 2020-10-13 ENCOUNTER — HOSPITAL ENCOUNTER (OUTPATIENT)
Dept: PHYSICAL THERAPY | Age: 75
Discharge: HOME | End: 2020-10-13
Payer: MEDICARE

## 2020-10-13 PROCEDURE — 97110 THERAPEUTIC EXERCISES: CPT

## 2020-10-13 NOTE — PROGRESS NOTES
PHYSICAL THERAPY - DAILY TREATMENT NOTE    Patient Name: Dom Stephens        Date: 10/13/2020  : 1945   YES Patient  Verified  Visit #:   4     Insurance: Payor: Gordon Cardoso / Plan: [place] & B / Product Type: Medicare /      In time: 154 Out time: 257   Total Treatment Time: 63     Medicare/BCBS Time Tracking (below)   Total Timed Codes (min):  53 1:1 Treatment Time:  40     TREATMENT AREA =  Low back pain [M54.5]    SUBJECTIVE    Pain Level (on 0 to 10 scale):  1  / 10   Medication Changes/New allergies or changes in medical history, any new surgeries or procedures?     NO    If yes, update Summary List   Subjective Functional Status/Changes:  []  No changes reported     Pt denies any issues following last session    Compliance with HEP  Yes [x] No []         OBJECTIVE  Therapeutic Procedures:  Min Procedure Specifics + Rationale   53(40) [x] Therapeutic Exercise    See Flowsheet   Rationale: increase ROM and increase strength to improve the patients ability to participate in ADL's      Modality rationale: decrease inflammation, decrease pain, increase tissue extensibility and increase muscle contraction/control to improve the patients ability to perform ADL's with greater ease   Time: 10 Additional Details    [] E-Stim:       [] Att                 [] Unatt                         [] IFC                [] TENS                           [] EDN: Location:   [] supine              [] prone  [] legs elevatedv   [] legs flat  []w/heat  []w/ice  []w/US     []  Traction:   [] Cervical          []Lumbar                          [] Intermitent      []Continuous Step x1 Ramp/hold/ lbs:     Angle:  [] supine              [] prone  [] legs elevated    [] legs flat    []  Heat          [] pre-MICHAEL          [] post-MICHAEL Location:    [] supine              [] prone  [] legs elevated    [] legs flat    [x]  Cold Pack  [] pre-MICHAEL          [x] post-MICHAEL  []  Ice massage Location: L/S   [x] supine [] prone  [x] legs elevated    [] legs flat    []  Vasopneumatic Device, press/temp                              Location  [] Right Knee []Left Shoulder  [] Left Knee []Right Ankle  [] Right Shoulder [] Left Ankle    Skin assessment post-treatment:  intact no adverse reaction          min Patient Education:  YES Reviewed HEP         Other Objective/Functional Measures:    See flowsheet for more details    mod VC and demos required throughout session for proper form and increased safety    Progressed therex per flowsheet     Post Treatment Pain Level (on 0 to 10) scale:   1  / 10     ASSESSMENT    Assessment/Changes in Function:      Patient tolerated treatment session well and is progressing well towards goals. Pt required the most cuing for hip hinge movement. Tactile cues and demonstrations were provided but she still had difficulty. []  See Progress Note/Recertification   Patient will continue to benefit from skilled PT services to analyze,, cue,, progress,, modify,, demonstrate,, instruct, and address, movement patterns,, therapeutic interventions,, postural abnormalities,, soft tissue restrictions,, ROM,, strength,, functional mobility,, body mechanics/ergonomics, and home and community integration, to attain remaining goals. Progress toward goals / Updated goals: ·   Short Term Goals: To be accomplished in  2-3  weeks:  1. Patient will demonstrate compliance with HEP. 2. Patient will report less than or equal to 6/10 pain at worst to allow increased activity tolerance. Pt reported 1/0 pain level at beginning of session  3. Patient will demonstrate 4/5 right gluteus medius strength to facilitate gait stability. · Long Term Goals: To be accomplished in  4-6  weeks:  ·   1. Patient will demonstrate independence with HEP. 2. Patient will report less than or equal to 4/10 pain at worst to allow increased activity tolerance.   3. Patient will score greater than or equal to 60/100 on FOTO to indicate improved function     PLAN    [x]  Upgrade activities as tolerated YES Continue plan of care   []  Discharge due to :    []  Other:      Therapist: Carmela Velez DPT    Date: 10/13/2020 Time: 1:51 PM     Future Appointments   Date Time Provider Iglesia Mera   10/13/2020  2:00 PM Carmen Evans [facility]   10/16/2020 10:15 AM Wenceslao Phillips PT [facility]   10/20/2020 10:15 AM Theodore Jasmine [facility]   10/23/2020 10:15 AM Wenceslao Phillips PT [facility]   10/27/2020 11:00 AM Theodore Jasmine [facility]   10/30/2020 10:15 AM Wenceslao Phillips PT [facility]

## 2020-10-16 ENCOUNTER — HOSPITAL ENCOUNTER (OUTPATIENT)
Dept: PHYSICAL THERAPY | Age: 75
Discharge: HOME | End: 2020-10-16
Payer: MEDICARE

## 2020-10-16 PROCEDURE — 97110 THERAPEUTIC EXERCISES: CPT

## 2020-10-16 NOTE — PROGRESS NOTES
PHYSICAL THERAPY - DAILY TREATMENT NOTE    Patient Name: Cindy Hunter        Date: 10/16/2020  : 1945   YES Patient  Verified  Visit #:     Insurance: Payor: Claude Bond / Plan: [place] MEDICARE PART A & B / Product Type: Medicare /      In time: 10:08 Out time: 10:48   Total Treatment Time: 40     Medicare/BCBS [place] Time Tracking (below)   Total Timed Codes (min):  40 1:1 Treatment Time:  40     TREATMENT AREA =  Low back pain [M54.5]  SUBJECTIVE    Pain Level (on 0 to 10 scale):  0  / 10   Medication Changes/New allergies or changes in medical history, any new surgeries or procedures? NO    If yes, update Summary List   Subjective Functional Status/Changes:  []  No changes reported     Pt denies pain at this time, reports compliance with HEP.           OBJECTIVE    40 min Therapeutic Exercise:  [x]  See flow sheet   Rationale:      increase ROM, increase strength and decrease symptoms to improve the patients ability to perform ADLs/IADLs, functional mobility and gait safely and independently without increased pain/symptoms     During TE min Patient Education:  YES  Reviewed HEP   []  Progressed/Changed HEP based on:   Cont HEP     Other Objective/Functional Measures:    Exercises per flowsheet  Increased resistance/reps t-band row/ext, H/L hip abd, clam  Added mini-squat - required verbal/tactile cues for proper form  Added HS stretch     Post Treatment Pain Level (on 0 to 10) scale:   0  / 10     ASSESSMENT    Assessment/Changes in Function:     Tolerated exercises without complaints  Continues to require verbal/tactile cues for proper form     []  See Progress Note/Recertification   Patient will continue to benefit from skilled PT services to modify and progress therapeutic interventions, address functional mobility deficits, address ROM deficits, address strength deficits, analyze and address soft tissue restrictions, analyze and cue movement patterns, analyze and modify body mechanics/ergonomics, assess and modify postural abnormalities and instruct in home and community integration to attain remaining goals. Progress toward goals / Updated goals:    Progressing toward goals: · Short Term Goals: To be accomplished in  2-3  weeks:  1. Patient will demonstrate compliance with HEP. - Reports compliance with HEP  2. Patient will report less than or equal to 6/10 pain at worst to allow increased activity tolerance. - Denies pain at this time  3. Patient will demonstrate 4/5 right gluteus medius strength to facilitate gait stability. - Clam  · Long Term Goals: To be accomplished in  4-6  weeks:  1. Patient will demonstrate independence with HEP. 2. Patient will report less than or equal to 4/10 pain at worst to allow increased activity tolerance. 3. Patient will score greater than or equal to 60/100 on FOTO to indicate improved function.        PLAN    [x]  Upgrade activities as tolerated YES Continue plan of care   []  Discharge due to :    []  Other:      Therapist: Richar Calixto PT    Date: 10/16/2020 Time: 10:07 AM     Future Appointments   Date Time Provider Iglesia Mera   10/16/2020 10:15 AM Juan Kohler, PT [facility]   [facility]2020 [facility]   10/23/2020 10:15 AM Juan Kohler, PT [facility]   10/27/2020 11:00 AM Dominique Smith [facility]   10/30/2020 10:15 AM Juan Kohler, PT [facility]

## 2020-10-20 ENCOUNTER — HOSPITAL ENCOUNTER (OUTPATIENT)
Dept: PHYSICAL THERAPY | Age: 75
Discharge: HOME | End: 2020-10-20
Payer: MEDICARE

## 2020-10-20 PROCEDURE — 97110 THERAPEUTIC EXERCISES: CPT

## 2020-10-20 NOTE — PROGRESS NOTES
PHYSICAL THERAPY - DAILY TREATMENT NOTE    Patient Name: Alfredo Tim        Date: 10/20/2020  : 1945   YES Patient  Verified  Visit #:   6     Insurance: Payor: Chelo Garcia / Plan: [place] MEDICARE PART A & B / Product Type: Medicare /      In time: 10:15 Out time: 11:11   Total Treatment Time: 56     Medicare/BCBS [place] Time Tracking (below)   Total Timed Codes (min):  56 1:1 Treatment Time:  56     TREATMENT AREA =  Low back pain [M54.5]    SUBJECTIVE    Pain Level (on 0 to 10 scale):  0  / 10   Medication Changes/New allergies or changes in medical history, any new surgeries or procedures? NO    If yes, update Summary List   Subjective Functional Status/Changes:  []  No changes reported     Pt reports she was stiff when she woke up this morning, pain was a 3/10, states she moves around and things loosen up. Sometimes I do exercises before I get out of bed. Pt states that RENEE helps a little, but her back tightens right back up after. OBJECTIVE    56 min Therapeutic Exercise:  [x]  See flow sheet   Rationale:     Increase core strength/stabilization, ROM/flexibility to improve pt's ability to perform ADL's with increased ease and less pain to promote increased activity tolerance. min Patient Education:  YES  Reviewed HEP   [x]  Progressed/Changed HEP based on:   Pt to trial right side glide to address stiffness in L/S in the am and assess symptom response. Added seated QL stretch to HEP. Other Objective/Functional Measures:    Right side glide to address lateral shift. Had pt perform in front of mirror for visual cueing. Also practiced at wall for HEP. Added pallof press for strengthening. Minnie Vinson for form with LE stretches.       Post Treatment Pain Level (on 0 to 10) scale:   0  / 10     ASSESSMENT    Assessment/Changes in Function:     Pt continues with c/o stiffness right>left L/S      []  See Progress Note/Recertification   Patient will continue to benefit from skilled PT services to  modify and progress therapeutic interventions, address functional mobility deficits, address ROM deficits, address strength deficits, analyze and address soft tissue restrictions, analyze and cue movement patterns, analyze and modify body mechanics/ergonomics, assess and modify postural abnormalities and instruct in home and community integration to attain remaining goals. Progress toward goals / Updated goals: · Short Term Goals: To be accomplished in  2-3  weeks:  1. Patient will demonstrate compliance with HEP. - Reports compliance with HEP  2. Patient will report less than or equal to 6/10 pain at worst to allow increased activity tolerance. - 3/10 stiffness in L/S in the AM  3. Patient will demonstrate 4/5 right gluteus medius strength to facilitate gait stability. -   · Long Term Goals: To be accomplished in  4-6  weeks:  1. Patient will demonstrate independence with HEP. 2. Patient will report less than or equal to 4/10 pain at worst to allow increased activity tolerance. 3. Patient will score greater than or equal to 60/100 on FOTO to indicate improved function.      PLAN    []  Upgrade activities as tolerated YES Continue plan of care   []  Discharge due to :    []  Other:      Therapist: Santhosh Kaye PTA    Date: 10/20/2020 Time: 10:58 AM     Future Appointments   Date Time Provider Iglesia Mera   10/23/2020 10:15 AM Nano Horton PT [facility]   10/27/2020 11:00 AM Luis Agarwal [facility]   10/30/2020 10:15 AM Nano Horton PT [facility]

## 2020-10-23 ENCOUNTER — HOSPITAL ENCOUNTER (OUTPATIENT)
Dept: PHYSICAL THERAPY | Age: 75
Discharge: HOME | End: 2020-10-23
Payer: MEDICARE

## 2020-10-23 PROCEDURE — 97110 THERAPEUTIC EXERCISES: CPT

## 2020-10-23 NOTE — PROGRESS NOTES
PHYSICAL THERAPY - DAILY TREATMENT NOTE    Patient Name: Brandy Covington        Date: 10/23/2020  : 1945   YES Patient  Verified  Visit #:     Insurance: Payor: Syl Shaffer / Plan: [place] MEDICARE PART A & B / Product Type: Medicare /      In time: 10:14 Out time: 11:01   Total Treatment Time: 47     Medicare/BCBS [place] Time Tracking (below)   Total Timed Codes (min):  47 1:1 Treatment Time:  47     TREATMENT AREA =  Low back pain [M54.5]  SUBJECTIVE    Pain Level (on 0 to 10 scale):  0  / 10   Medication Changes/New allergies or changes in medical history, any new surgeries or procedures? NO    If yes, update Summary List   Subjective Functional Status/Changes:  []  No changes reported     Pt reports stiffness, but denies pain.           OBJECTIVE    47 min Therapeutic Exercise:  [x]  See flow sheet   Rationale:      increase ROM, increase strength, improve coordination, improve balance and increase proprioception to improve the patients ability to perform ADLs/IADLs, functional mobility and gait safely and independently without increased pain/symptoms     During TE min Patient Education:  YES  Reviewed HEP   []  Progressed/Changed HEP based on:   Cont HEP     Other Objective/Functional Measures:    Exercises per flowsheet    Provided verbal cues and demonstration for proper form with mini-squat  Added stand hip 3-way, SB O  Increased reps clam     Post Treatment Pain Level (on 0 to 10) scale:   0  / 10     ASSESSMENT    Assessment/Changes in Function:     Tolerated exercises without c/o increased pain     []  See Progress Note/Recertification   Patient will continue to benefit from skilled PT services to modify and progress therapeutic interventions, address functional mobility deficits, address ROM deficits, address strength deficits, analyze and address soft tissue restrictions, analyze and cue movement patterns, analyze and modify body mechanics/ergonomics, assess and modify postural abnormalities, address imbalance/dizziness and instruct in home and community integration to attain remaining goals. Progress toward goals / Updated goals: · Short Term Goals: To be accomplished in  2-3  weeks:  1. Patient will demonstrate compliance with HEP.  - Reports compliance with HEP  2. Patient will report less than or equal to 6/10 pain at worst to allow increased activity tolerance. - No pain at this time  3. Patient will demonstrate 4/5 right gluteus medius strength to facilitate gait stability. - clam  · Long Term Goals: To be accomplished in  4-6  weeks:  1. Patient will demonstrate independence with HEP. 2. Patient will report less than or equal to 4/10 pain at worst to allow increased activity tolerance. 3. Patient will score greater than or equal to 60/100 on FOTO to indicate improved function.      PLAN    [x]  Upgrade activities as tolerated YES Continue plan of care   []  Discharge due to :    []  Other:      Therapist: Pari De Leon PT    Date: 10/23/2020 Time: 10:14 AM     Future Appointments   Date Time Provider Iglesia Mera   10/23/2020 10:15 AM Koko Malik, PT [facility]   10/27/2020 11:00 AM Latha Verdin [facility]   10/30/2020 10:15 AM Koko Malik, PT [facility]

## 2020-10-27 ENCOUNTER — APPOINTMENT (OUTPATIENT)
Dept: PHYSICAL THERAPY | Age: 75
End: 2020-10-27
Payer: MEDICARE

## 2020-10-30 ENCOUNTER — HOSPITAL ENCOUNTER (OUTPATIENT)
Dept: PHYSICAL THERAPY | Age: 75
Discharge: HOME | End: 2020-10-30
Payer: MEDICARE

## 2020-10-30 PROCEDURE — 97110 THERAPEUTIC EXERCISES: CPT

## 2020-10-30 NOTE — PROGRESS NOTES
PHYSICAL THERAPY - DAILY TREATMENT NOTE    Patient Name: Bernardino Hines        Date: 10/30/2020  : 1945   YES Patient  Verified  Visit #:     Insurance: Payor: Fifi Martin / Plan: [place] MEDICARE PART A & B / Product Type: Medicare /      In time: 10:16 Out time: 10;54   Total Treatment Time: 38     Medicare/BCBS [place] Time Tracking (below)   Total Timed Codes (min):  38 1:1 Treatment Time:  38     TREATMENT AREA =  Low back pain [M54.5]  SUBJECTIVE    Pain Level (on 0 to 10 scale):  1  / 10   Medication Changes/New allergies or changes in medical history, any new surgeries or procedures? NO    If yes, update Summary List   Subjective Functional Status/Changes:  []  No changes reported     Pt reports /10 LBP. Pt requests one more visit to review exercises prior to DC since she missed last visit due to conflicting MD appointment.        OBJECTIVE    38 min Therapeutic Exercise:  [x]  See flow sheet   Rationale:      increase ROM, increase strength and increase proprioception to improve the patients ability to perform ADLs/IADLs, functional mobility and gait safely and independently without increased pain/symptoms     During TE min Patient Education:  YES  Reviewed HEP   []  Progressed/Changed HEP based on:   Cont HEP; added several stretches (copy in chart)     Other Objective/Functional Measures:    Performed right side glide at wall due to lateral shift     Post Treatment Pain Level (on 0 to 10) scale:   0  / 10     ASSESSMENT    Assessment/Changes in Function:     Pain abolished at end of treatment session     []  See Progress Note/Recertification   Patient will continue to benefit from skilled PT services to modify and progress therapeutic interventions, address functional mobility deficits, address ROM deficits, address strength deficits, analyze and address soft tissue restrictions, analyze and cue movement patterns, analyze and modify body mechanics/ergonomics, assess and modify postural abnormalities and instruct in home and community integration to attain remaining goals. Progress toward goals / Updated goals:    Progressing toward goals:  · Term Goals: To be accomplished in  2-3  weeks:  1. Patient will demonstrate compliance with HEP.  - Reports compliance with HEP  2. Patient will report less than or equal to 6/10 pain at worst to allow increased activity tolerance. - No pain at end of session  3. Patient will demonstrate 4/5 right gluteus medius strength to facilitate gait stability. - clam  · Long Term Goals: To be accomplished in  4-6  weeks:  1. Patient will demonstrate independence with HEP. 2. Patient will report less than or equal to 4/10 pain at worst to allow increased activity tolerance. 3. Patient will score greater than or equal to 60/100 on FOTO to indicate improved function. PLAN    [x]  Upgrade activities as tolerated YES Continue plan of care   []  Discharge due to :    []  Other:      Therapist: Carolyn Orr PT    Date: 10/30/2020 Time: 10:31 AM     No future appointments.

## 2020-11-04 ENCOUNTER — HOSPITAL ENCOUNTER (OUTPATIENT)
Dept: PHYSICAL THERAPY | Age: 75
Discharge: HOME | End: 2020-11-04
Payer: MEDICARE

## 2020-11-04 PROCEDURE — 97530 THERAPEUTIC ACTIVITIES: CPT

## 2020-11-04 PROCEDURE — 97110 THERAPEUTIC EXERCISES: CPT

## 2020-11-04 NOTE — PROGRESS NOTES
[id number] PHYSICAL THERAPY  [address] [place] [address] Milady Brooks, [address] - Phone: [phone]  Fax: [phone]  DISCHARGE SUMMARY FOR PHYSICAL THERAPY          Patient Name: Anastasia Chapin : 1945   Treatment/Medical Diagnosis: Low back pain [M54.5]   Onset Date: Chronic with recent worsening      Referral Source: Ladonna Farmer MD Start of Care [facility] [place]): 10/2/2020   Prior Hospitalization: See Medical History Provider #: [id number]   Prior Level of Function: Independent with ADLs, ambulation; doing housework, gardening; exercising regularly   Comorbidities: arthritis, HTN, left TKR, B foot surgery, sleep apnea, B cataract surgery   Medications: Verified on Patient Summary List   Visits from [facility]: 9 Missed Visits: 0     Goal/Measure of Progress Goal Met? 1. Patient will demonstrate independence with HEP. Status at last Eval: NA Current Status: Independent with HEP yes   2. Patient will report less than or equal to 4/10 pain at worst to allow increased activity tolerance. Status at last Eval: 9/10 at worst Current Status: 1/10 at worst yes   3. Patient will score greater than or equal to 60/100 on FOTO to indicate improved function. Status at last Eval: FOTO = 56/100 Current Status: FOTO = 65/100 yes     Key Functional Changes/Progress: Patient has progressed well with exercises in clinic, and demonstrates independence with HEP. Lateral shift resolved and L/S ROM and B hip strength have improved (see below). Patient has reported 1/10 pain at worst in clinic over past 4 weeks. FOTO has improved from 56/100 to 65/100, indicating improved function. Posture:  Lateral Shift:    []? ? R    []? ? L      []? ? +  [x]? ? -  Kyphosis:        [x]? ? Increased   []? ? Decreased   []? ?  WNL  Lordosis:         [x]? ? Increased   [x]? ? Decreased   []? ? WNL  Pelvic symmetry: [x]? ? WNL       []? ? Other:     Active Movements: []?? N/A   []? ? Too acute   []? ? Other:  ROM % AROM % PROM Comments:pain, area   Forward flexion 40-60 75%   R L/S stretch/discomfort   Extension 20-30 75%   NE   SB right 20-30 75%   R L/S discomfort   SB left 20-30 75%   L L/S discomfort   Rotation right 5-10 75%   NE   Rotation left 5-10 75%   NE         Strength    L(0-5) R (0-5) N/T   Hip Flexion (L1,2) 4 4 []? ?    Knee Extension (L3,4) 5 5 []? ?    Ankle Dorsiflexion (L4) 5 5 []? ?    Great Toe Extension (L5) 5 5 []? ?    Ankle Plantarflexion (S1) 5 5 []? ?    Knee Flexion (S1,2) 5 5 []? ?    Upper Abdominals     [x]? ?    Lower Abdominals     [x]? ?    Paraspinals     [x]? ?    Back Rotators     [x]? ?    Gluteus Vic 4 4 []? ?    Other - Gluteus Medius 4 4 []? ?      Assessments/Recommendations: Discontinue therapy. Progressing towards or have reached established goals. If you have any questions/comments please contact us directly at [phone]. Thank you for allowing us to assist in the care of your patient. Therapist Signature: Dom Dunham PT Date: 11/4/2020   Reporting Period: 10/2/2020-11/4/2020 Time: 10:09 AM     NOTE TO PHYSICIAN:  PLEASE COMPLETE THE ORDERS BELOW AND FAX TO   [facility]: [phone] If you are unable to process this request in 24 hours please contact our office: [phone].    ___ I have read the above report and request that my patient be discharged from therapy.      Physician Signature:        Date:______Time:

## 2020-11-04 NOTE — PROGRESS NOTES
PHYSICAL THERAPY - DAILY TREATMENT NOTE    Patient Name: Brandy Covington        Date: 2020  : 1945   YES Patient  Verified  Visit #:     Insurance: Payor: Syl Shaffer / Plan: [place] / Product Type: Medicare /      In time: 9:20 Out time: 10:07   Total Treatment Time: 47     Medicare/[employer]BS [place] Time Tracking (below)   Total Timed Codes (min):  47 1:1 Treatment Time:  47     TREATMENT AREA =  Low back pain [M54.5]  SUBJECTIVE    Pain Level (on 0 to 10 scale):  1  / 10 LBP   Medication Changes/New allergies or changes in medical history, any new surgeries or procedures? NO    If yes, update Summary List   Subjective Functional Status/Changes:  []  No changes reported     Pt reports that she feels discomfort and shortness of breath when she performs side glide at wall, states that she may be holding her breath because of discomfort. OBJECTIVE    37 min Therapeutic Exercise:  [x]  See flow sheet   Rationale:      increase ROM and increase strength to improve the patients ability to perform ADLs/IADLs, functional mobility and gait safely and independently without increased pain/symptoms     10 min Therapeutic Activity: FOTO   Rationale: assess ADL/IADL function, functional mobility and gait to improve the patient's ability to perform ADLs/IADLs, functional mobility and gait safely and independently without increased pain/symptoms      During TE min Patient Education:  YES  Reviewed HEP   []  Progressed/Changed HEP based on:   Advised to hold side glide at wall, continue remainder of HEP     Other Objective/Functional Measures:    Exercises per flowsheet    FOTO = 65/100, stage 4, little difficulty performing usual work or household activities and hobbies    Posture:  Lateral Shift:    []? R    []? L      []? +  [x]? -  Kyphosis:        [x]? Increased   []? Decreased   []? WNL  Lordosis:         [x]? Increased   [x]? Decreased   []? WNL  Pelvic symmetry: [x]?  WNL []? Other:    Active Movements: []? N/A   []? Too acute   []? Other:  ROM % AROM % PROM Comments:pain, area   Forward flexion 40-60 75%  R L/S stretch/discomfort   Extension 20-30 75%  NE   SB right 20-30 75%  R L/S discomfort   SB left 20-30 75%  L L/S discomfort   Rotation right 5-10 75%  NE   Rotation left 5-10 75%  NE        Strength    L(0-5) R (0-5) N/T   Hip Flexion (L1,2) 4 4 []?    Knee Extension (L3,4) 5 5 []?    Ankle Dorsiflexion (L4) 5 5 []?    Great Toe Extension (L5) 5 5 []?    Ankle Plantarflexion (S1) 5 5 []?    Knee Flexion (S1,2) 5 5 []?    Upper Abdominals     [x]?     Lower Abdominals     [x]?    Paraspinals     [x]?     Back Rotators     [x]?    Gluteus Vic 4 4 []?    Other - Gluteus Medius 4 4 []?         Post Treatment Pain Level (on 0 to 10) scale:   1  / 10     ASSESSMENT    Assessment/Changes in Function:     See discharge note     []  See Progress Note/Recertification      Progress toward goals / Updated goals:    See discharge note     PLAN    []  Upgrade activities as tolerated NO Continue plan of care   [x]  Discharge due to : I with HEP; goals met   []  Other:      Therapist: Roxana Peters, PT    Date: 11/4/2020 Time: 9:21 AM     Future Appointments   Date Time Provider [place]   11/4/2020  9:30 AM Elisabeth Martinez, PT [facility]

## 2020-12-22 ENCOUNTER — TRANSCRIBE ORDER (OUTPATIENT)
Dept: SCHEDULING | Age: 75
End: 2020-12-22

## 2020-12-22 DIAGNOSIS — Z12.31 VISIT FOR SCREENING MAMMOGRAM: Primary | ICD-10-CM

## 2021-01-21 ENCOUNTER — HOSPITAL ENCOUNTER (OUTPATIENT)
Dept: MAMMOGRAPHY | Age: 76
Discharge: HOME | End: 2021-01-21
Payer: MEDICARE

## 2021-01-21 DIAGNOSIS — Z12.31 VISIT FOR SCREENING MAMMOGRAM: ICD-10-CM

## 2021-01-21 PROCEDURE — 77063 BREAST TOMOSYNTHESIS BI: CPT

## 2021-02-02 ENCOUNTER — HOSPITAL ENCOUNTER (OUTPATIENT)
Dept: MAMMOGRAPHY | Age: 76
Discharge: HOME | End: 2021-02-02
Payer: MEDICARE

## 2021-02-02 ENCOUNTER — HOSPITAL ENCOUNTER (OUTPATIENT)
Dept: ULTRASOUND IMAGING | Age: 76
Discharge: HOME | End: 2021-02-02
Payer: MEDICARE

## 2021-02-02 DIAGNOSIS — R92.8 ABNORMAL FINDING ON BREAST IMAGING: ICD-10-CM

## 2021-02-02 PROCEDURE — 77061 BREAST TOMOSYNTHESIS UNI: CPT

## 2021-02-02 PROCEDURE — 76642 ULTRASOUND BREAST LIMITED: CPT

## 2022-01-24 ENCOUNTER — HOSPITAL ENCOUNTER (OUTPATIENT)
Dept: WOMENS IMAGING | Age: 77
Discharge: HOME | End: 2022-01-24
Payer: MEDICARE

## 2022-01-24 ENCOUNTER — TRANSCRIBE ORDER (OUTPATIENT)
Dept: SCHEDULING | Age: 77
End: 2022-01-24

## 2022-01-24 DIAGNOSIS — Z12.31 VISIT FOR SCREENING MAMMOGRAM: Primary | ICD-10-CM

## 2022-01-24 DIAGNOSIS — Z12.31 VISIT FOR SCREENING MAMMOGRAM: ICD-10-CM

## 2022-01-24 PROCEDURE — 77063 BREAST TOMOSYNTHESIS BI: CPT

## 2022-03-19 PROBLEM — M17.12 OSTEOARTHRITIS OF LEFT KNEE: Status: ACTIVE | Noted: 2019-02-28

## 2023-01-25 ENCOUNTER — HOSPITAL ENCOUNTER (OUTPATIENT)
Dept: WOMENS IMAGING | Age: 78
Discharge: HOME | End: 2023-01-25
Payer: MEDICARE

## 2023-01-25 DIAGNOSIS — Z12.31 VISIT FOR SCREENING MAMMOGRAM: ICD-10-CM

## 2023-01-25 PROCEDURE — 77063 BREAST TOMOSYNTHESIS BI: CPT

## 2023-02-01 DIAGNOSIS — Z12.31 VISIT FOR SCREENING MAMMOGRAM: Primary | ICD-10-CM

## 2023-02-04 DIAGNOSIS — Z12.31 VISIT FOR SCREENING MAMMOGRAM: Primary | ICD-10-CM

## 2023-02-07 DIAGNOSIS — Z12.31 VISIT FOR SCREENING MAMMOGRAM: Primary | ICD-10-CM

## 2024-01-12 ENCOUNTER — TRANSCRIBE ORDERS (OUTPATIENT)
Facility: HOSPITAL | Age: 79
End: 2024-01-12

## 2024-01-12 DIAGNOSIS — Z12.31 VISIT FOR SCREENING MAMMOGRAM: Primary | ICD-10-CM

## 2024-01-26 ENCOUNTER — HOSPITAL ENCOUNTER (OUTPATIENT)
Dept: WOMENS IMAGING | Facility: HOSPITAL | Age: 79
End: 2024-01-26
Payer: MEDICARE

## 2024-01-26 VITALS — HEIGHT: 62 IN

## 2024-01-26 DIAGNOSIS — Z12.31 VISIT FOR SCREENING MAMMOGRAM: ICD-10-CM

## 2024-01-26 PROCEDURE — 77063 BREAST TOMOSYNTHESIS BI: CPT

## 2024-10-02 NOTE — PERIOP NOTES
Sharron Rehman is receiving a patient currently and once she is done she will call for report on this patient. Statement Selected

## 2024-11-07 ENCOUNTER — HOSPITAL ENCOUNTER (OUTPATIENT)
Dept: WOMENS IMAGING | Facility: HOSPITAL | Age: 79
Discharge: HOME | End: 2024-11-10
Payer: MEDICARE

## 2024-11-07 ENCOUNTER — HOSPITAL ENCOUNTER (OUTPATIENT)
Facility: HOSPITAL | Age: 79
Discharge: HOME | End: 2024-11-10
Payer: MEDICARE

## 2024-11-07 DIAGNOSIS — N64.4 BREAST PAIN: ICD-10-CM

## 2024-11-07 PROCEDURE — 76642 ULTRASOUND BREAST LIMITED: CPT

## 2024-11-07 PROCEDURE — G0279 TOMOSYNTHESIS, MAMMO: HCPCS

## 2025-02-18 ENCOUNTER — HOSPITAL ENCOUNTER (OUTPATIENT)
Dept: WOMENS IMAGING | Facility: HOSPITAL | Age: 80
Discharge: HOME | End: 2025-02-21
Payer: MEDICARE

## 2025-02-18 DIAGNOSIS — Z12.31 VISIT FOR SCREENING MAMMOGRAM: ICD-10-CM

## 2025-02-18 PROCEDURE — 77063 BREAST TOMOSYNTHESIS BI: CPT

## (undated) DEVICE — SOLUTION IV 100ML 0.9% SOD CHL DIL INJ

## (undated) DEVICE — 3 BONE CEMENT MIXER: Brand: MIXEVAC

## (undated) DEVICE — SOL INJ L R 1000ML BG --

## (undated) DEVICE — BLADE SAW W13XL90MM 1.19MM PARA

## (undated) DEVICE — PIN GUIDE FIX 3.2X62 MM SCREW [GS9030620324P] [KOMET MEDICAL]

## (undated) DEVICE — SOL IRRIGATION INJ NACL 0.9% 500ML BTL

## (undated) DEVICE — DRSG MEPILES BORDER AG 4X12 -- 5/BX

## (undated) DEVICE — BLADE SAW 1.19X20X90 MM FOR LG BNE

## (undated) DEVICE — NEEDLE SPNL 20GA L3.5IN YEL HUB S STL REG WALL FIT STYL W/

## (undated) DEVICE — SUTURE STRATAFIX SYMMETRIC PDS + 1 SGL ARMED CT 18 IN LEN SXPP1A405

## (undated) DEVICE — REM POLYHESIVE ADULT PATIENT RETURN ELECTRODE: Brand: VALLEYLAB

## (undated) DEVICE — PIN GUIDE FIX 3.2X62 MM SCREW [GS903A0620322P] [KOMET MEDICAL]

## (undated) DEVICE — Device

## (undated) DEVICE — THE CANADY HYBRID PLASMA SCALPEL IS AN ELECTROSURGICAL PLASMA SCALPEL THAT USES AN 85MM BENDABLE PADDLE BLADE TIP. THE ELECTROSURGICAL PLASMA SCALPEL IS USED TO SIMULTANEOUSLY CUT AND COAGULATE BIOLOGICAL TISSUE.: Brand: CANADY HYBRID PLASMA PADDLE BLADE

## (undated) DEVICE — HANDPIECE SET WITH FAN SPRAY TIP: Brand: INTERPULSE

## (undated) DEVICE — SOLUTION SCRB 4OZ 10% PVP I POVIDONE IOD TOP PAINT EXIDINE

## (undated) DEVICE — NDL SPNE QNCKE 18GX3.5IN LF --

## (undated) DEVICE — SUT VCRL + 1 36IN CT1 VIO --

## (undated) DEVICE — ADHESIVE SKIN CLOSURE 4X22 CM PREMIERPRO EXOFINFUSION DISP

## (undated) DEVICE — SUT VCRL + 2-0 36IN CT1 UD --

## (undated) DEVICE — KENDALL SCD EXPRESS FOOT CUFF, MEDIUM: Brand: KENDALL SCD